# Patient Record
Sex: MALE | Race: WHITE | Employment: OTHER | URBAN - METROPOLITAN AREA
[De-identification: names, ages, dates, MRNs, and addresses within clinical notes are randomized per-mention and may not be internally consistent; named-entity substitution may affect disease eponyms.]

---

## 2017-05-13 PROBLEM — R00.0 SINUS TACHYCARDIA: Status: ACTIVE | Noted: 2017-05-13

## 2017-06-02 PROBLEM — L12.0 BULLOUS PEMPHIGOID: Status: ACTIVE | Noted: 2017-06-02

## 2017-10-21 PROBLEM — N17.9 AKI (ACUTE KIDNEY INJURY) (HCC): Status: ACTIVE | Noted: 2017-10-21

## 2017-10-21 PROBLEM — N39.0 UTI (URINARY TRACT INFECTION): Status: ACTIVE | Noted: 2017-10-21

## 2017-10-21 PROBLEM — E16.2 HYPOGLYCEMIA: Status: ACTIVE | Noted: 2017-10-21

## 2018-04-02 ENCOUNTER — HOSPITAL ENCOUNTER (INPATIENT)
Age: 68
LOS: 3 days | Discharge: NURSING, REHAB OR LONG-TERM CARE | DRG: 638 | End: 2018-04-05
Attending: EMERGENCY MEDICINE | Admitting: INTERNAL MEDICINE
Payer: COMMERCIAL

## 2018-04-02 DIAGNOSIS — D64.9 ANEMIA, UNSPECIFIED TYPE: Primary | ICD-10-CM

## 2018-04-02 DIAGNOSIS — N17.9 ACUTE KIDNEY INJURY (HCC): ICD-10-CM

## 2018-04-02 DIAGNOSIS — E86.0 DEHYDRATION: ICD-10-CM

## 2018-04-02 DIAGNOSIS — R73.9 HYPERGLYCEMIA: ICD-10-CM

## 2018-04-02 PROBLEM — N39.0 UTI (URINARY TRACT INFECTION): Status: RESOLVED | Noted: 2017-10-21 | Resolved: 2018-04-02

## 2018-04-02 PROBLEM — D63.8 ANEMIA OF CHRONIC DISEASE: Chronic | Status: ACTIVE | Noted: 2018-04-02

## 2018-04-02 PROBLEM — E16.2 HYPOGLYCEMIA: Status: RESOLVED | Noted: 2017-10-21 | Resolved: 2018-04-02

## 2018-04-02 PROBLEM — E78.5 HYPERLIPIDEMIA LDL GOAL <100: Chronic | Status: ACTIVE | Noted: 2018-04-02

## 2018-04-02 PROBLEM — L12.0 BULLOUS PEMPHIGOID: Status: RESOLVED | Noted: 2017-06-02 | Resolved: 2018-04-02

## 2018-04-02 PROBLEM — E03.9 ACQUIRED HYPOTHYROIDISM: Chronic | Status: ACTIVE | Noted: 2018-04-02

## 2018-04-02 PROBLEM — R00.0 SINUS TACHYCARDIA: Status: RESOLVED | Noted: 2017-05-13 | Resolved: 2018-04-02

## 2018-04-02 LAB
ABO/RH: NORMAL
ABO/RH: NORMAL
ALBUMIN SERPL-MCNC: 2.9 G/DL (ref 3.5–5.2)
ALP BLD-CCNC: 87 U/L (ref 40–129)
ALT SERPL-CCNC: 18 U/L (ref 0–40)
ANION GAP SERPL CALCULATED.3IONS-SCNC: 13 MMOL/L (ref 7–16)
ANION GAP SERPL CALCULATED.3IONS-SCNC: 18 MMOL/L (ref 7–16)
ANISOCYTOSIS: ABNORMAL
ANTIBODY IDENTIFICATION: NORMAL
ANTIBODY SCREEN: NORMAL
AST SERPL-CCNC: 11 U/L (ref 0–39)
BACTERIA: NORMAL /HPF
BASOPHILS ABSOLUTE: 0.06 E9/L (ref 0–0.2)
BASOPHILS RELATIVE PERCENT: 0.6 % (ref 0–2)
BETA-HYDROXYBUTYRATE: 0.04 MMOL/L (ref 0.02–0.27)
BILIRUB SERPL-MCNC: <0.2 MG/DL (ref 0–1.2)
BILIRUBIN URINE: NEGATIVE
BLOOD, URINE: NEGATIVE
BUN BLDV-MCNC: 42 MG/DL (ref 8–23)
BUN BLDV-MCNC: 48 MG/DL (ref 8–23)
CALCIUM SERPL-MCNC: 9.6 MG/DL (ref 8.6–10.2)
CALCIUM SERPL-MCNC: 9.7 MG/DL (ref 8.6–10.2)
CHLORIDE BLD-SCNC: 103 MMOL/L (ref 98–107)
CHLORIDE BLD-SCNC: 105 MMOL/L (ref 98–107)
CHLORIDE URINE RANDOM: 64 MMOL/L
CHP ED QC CHECK: NORMAL
CLARITY: CLEAR
CO2: 17 MMOL/L (ref 22–29)
CO2: 22 MMOL/L (ref 22–29)
COLOR: YELLOW
CREAT SERPL-MCNC: 2.2 MG/DL (ref 0.7–1.2)
CREAT SERPL-MCNC: 2.6 MG/DL (ref 0.7–1.2)
CREATININE URINE: 34 MG/DL (ref 40–278)
DAT POLYSPECIFIC: NORMAL
DR. NOTIFY: NORMAL
EOSINOPHILS ABSOLUTE: 0.47 E9/L (ref 0.05–0.5)
EOSINOPHILS RELATIVE PERCENT: 5 % (ref 0–6)
GFR AFRICAN AMERICAN: 30
GFR AFRICAN AMERICAN: 30
GFR AFRICAN AMERICAN: 36
GFR NON-AFRICAN AMERICAN: 25 ML/MIN/1.73
GFR NON-AFRICAN AMERICAN: 25 ML/MIN/1.73
GFR NON-AFRICAN AMERICAN: 30 ML/MIN/1.73
GLUCOSE BLD-MCNC: 213 MG/DL
GLUCOSE BLD-MCNC: 220 MG/DL
GLUCOSE BLD-MCNC: 228 MG/DL
GLUCOSE BLD-MCNC: 230 MG/DL
GLUCOSE BLD-MCNC: 259 MG/DL (ref 74–109)
GLUCOSE BLD-MCNC: 275 MG/DL
GLUCOSE BLD-MCNC: 315 MG/DL (ref 74–109)
GLUCOSE BLD-MCNC: 331 MG/DL (ref 74–109)
GLUCOSE BLD-MCNC: 412 MG/DL
GLUCOSE URINE: >=1000 MG/DL
HCT VFR BLD CALC: 25.5 % (ref 37–54)
HEMOGLOBIN: 7.2 G/DL (ref 12.5–16.5)
HYPOCHROMIA: ABNORMAL
IMMATURE GRANULOCYTES #: 0.45 E9/L
IMMATURE GRANULOCYTES %: 4.8 % (ref 0–5)
KETONES, URINE: NEGATIVE MG/DL
LACTIC ACID: 1.6 MMOL/L (ref 0.5–2.2)
LEUKOCYTE ESTERASE, URINE: NEGATIVE
LYMPHOCYTES ABSOLUTE: 1.52 E9/L (ref 1.5–4)
LYMPHOCYTES RELATIVE PERCENT: 16.3 % (ref 20–42)
MCH RBC QN AUTO: 26.2 PG (ref 26–35)
MCHC RBC AUTO-ENTMCNC: 28.2 % (ref 32–34.5)
MCV RBC AUTO: 92.7 FL (ref 80–99.9)
METER GLUCOSE: 213 MG/DL (ref 70–110)
METER GLUCOSE: 220 MG/DL (ref 70–110)
METER GLUCOSE: 228 MG/DL (ref 70–110)
METER GLUCOSE: 230 MG/DL (ref 70–110)
METER GLUCOSE: 241 MG/DL (ref 70–110)
METER GLUCOSE: 250 MG/DL (ref 70–110)
METER GLUCOSE: 275 MG/DL (ref 70–110)
METER GLUCOSE: 412 MG/DL (ref 70–110)
MICROALBUMIN UR-MCNC: 249.7 MG/L
MICROALBUMIN/CREAT UR-RTO: 734.4 (ref 0–30)
MONOCYTES ABSOLUTE: 0.63 E9/L (ref 0.1–0.95)
MONOCYTES RELATIVE PERCENT: 6.8 % (ref 2–12)
NEUTROPHILS ABSOLUTE: 6.2 E9/L (ref 1.8–7.3)
NEUTROPHILS RELATIVE PERCENT: 66.5 % (ref 43–80)
NITRITE, URINE: NEGATIVE
PDW BLD-RTO: 17.5 FL (ref 11.5–15)
PH UA: 6 (ref 5–9)
PH VENOUS: 7.34 (ref 7.3–7.42)
PLATELET # BLD: 389 E9/L (ref 130–450)
PMV BLD AUTO: 10.2 FL (ref 7–12)
POC CHLORIDE: 112 MMOL/L (ref 100–108)
POC CREATININE: 2.6 MG/DL (ref 0.7–1.2)
POC POTASSIUM: 4.3 MMOL/L (ref 3.5–5)
POC SODIUM: 142 MMOL/L (ref 132–146)
POLYCHROMASIA: ABNORMAL
POTASSIUM SERPL-SCNC: 4.4 MMOL/L (ref 3.5–5)
POTASSIUM SERPL-SCNC: 4.9 MMOL/L (ref 3.5–5)
POTASSIUM, UR: 13.4 MMOL/L
PROTEIN PROTEIN: 53 MG/DL (ref 0–12)
PROTEIN UA: ABNORMAL MG/DL
RBC # BLD: 2.75 E12/L (ref 3.8–5.8)
RBC UA: NORMAL /HPF (ref 0–2)
SODIUM BLD-SCNC: 138 MMOL/L (ref 132–146)
SODIUM BLD-SCNC: 140 MMOL/L (ref 132–146)
SODIUM URINE: 83 MMOL/L
SPECIFIC GRAVITY UA: 1.01 (ref 1–1.03)
TOTAL PROTEIN: 6.9 G/DL (ref 6.4–8.3)
UREA NITROGEN, UR: 354 MG/DL (ref 800–1666)
UROBILINOGEN, URINE: 0.2 E.U./DL
WBC # BLD: 9.3 E9/L (ref 4.5–11.5)
WBC UA: NORMAL /HPF (ref 0–5)

## 2018-04-02 PROCEDURE — 51702 INSERT TEMP BLADDER CATH: CPT

## 2018-04-02 PROCEDURE — 2580000003 HC RX 258: Performed by: EMERGENCY MEDICINE

## 2018-04-02 PROCEDURE — 86880 COOMBS TEST DIRECT: CPT

## 2018-04-02 PROCEDURE — 2140000000 HC CCU INTERMEDIATE R&B

## 2018-04-02 PROCEDURE — 82570 ASSAY OF URINE CREATININE: CPT

## 2018-04-02 PROCEDURE — 86922 COMPATIBILITY TEST ANTIGLOB: CPT

## 2018-04-02 PROCEDURE — 86900 BLOOD TYPING SEROLOGIC ABO: CPT

## 2018-04-02 PROCEDURE — 85025 COMPLETE CBC W/AUTO DIFF WBC: CPT

## 2018-04-02 PROCEDURE — 82962 GLUCOSE BLOOD TEST: CPT

## 2018-04-02 PROCEDURE — 83605 ASSAY OF LACTIC ACID: CPT

## 2018-04-02 PROCEDURE — 6360000002 HC RX W HCPCS: Performed by: EMERGENCY MEDICINE

## 2018-04-02 PROCEDURE — 84540 ASSAY OF URINE/UREA-N: CPT

## 2018-04-02 PROCEDURE — 6370000000 HC RX 637 (ALT 250 FOR IP): Performed by: INTERNAL MEDICINE

## 2018-04-02 PROCEDURE — 81001 URINALYSIS AUTO W/SCOPE: CPT

## 2018-04-02 PROCEDURE — 84295 ASSAY OF SERUM SODIUM: CPT

## 2018-04-02 PROCEDURE — 86901 BLOOD TYPING SEROLOGIC RH(D): CPT

## 2018-04-02 PROCEDURE — 82435 ASSAY OF BLOOD CHLORIDE: CPT

## 2018-04-02 PROCEDURE — 36415 COLL VENOUS BLD VENIPUNCTURE: CPT

## 2018-04-02 PROCEDURE — 86870 RBC ANTIBODY IDENTIFICATION: CPT

## 2018-04-02 PROCEDURE — 82565 ASSAY OF CREATININE: CPT

## 2018-04-02 PROCEDURE — 2580000003 HC RX 258: Performed by: INTERNAL MEDICINE

## 2018-04-02 PROCEDURE — 94799 UNLISTED PULMONARY SVC/PX: CPT

## 2018-04-02 PROCEDURE — 84300 ASSAY OF URINE SODIUM: CPT

## 2018-04-02 PROCEDURE — 80053 COMPREHEN METABOLIC PANEL: CPT

## 2018-04-02 PROCEDURE — 82436 ASSAY OF URINE CHLORIDE: CPT

## 2018-04-02 PROCEDURE — 82947 ASSAY GLUCOSE BLOOD QUANT: CPT

## 2018-04-02 PROCEDURE — 82010 KETONE BODYS QUAN: CPT

## 2018-04-02 PROCEDURE — 84132 ASSAY OF SERUM POTASSIUM: CPT

## 2018-04-02 PROCEDURE — 80048 BASIC METABOLIC PNL TOTAL CA: CPT

## 2018-04-02 PROCEDURE — 99285 EMERGENCY DEPT VISIT HI MDM: CPT

## 2018-04-02 PROCEDURE — 84156 ASSAY OF PROTEIN URINE: CPT

## 2018-04-02 PROCEDURE — 82800 BLOOD PH: CPT

## 2018-04-02 PROCEDURE — 86850 RBC ANTIBODY SCREEN: CPT

## 2018-04-02 PROCEDURE — 82044 UR ALBUMIN SEMIQUANTITATIVE: CPT

## 2018-04-02 PROCEDURE — 6360000002 HC RX W HCPCS: Performed by: INTERNAL MEDICINE

## 2018-04-02 PROCEDURE — 94760 N-INVAS EAR/PLS OXIMETRY 1: CPT

## 2018-04-02 PROCEDURE — 84133 ASSAY OF URINE POTASSIUM: CPT

## 2018-04-02 RX ORDER — ONDANSETRON 2 MG/ML
4 INJECTION INTRAMUSCULAR; INTRAVENOUS EVERY 6 HOURS PRN
Status: DISCONTINUED | OUTPATIENT
Start: 2018-04-02 | End: 2018-04-05 | Stop reason: HOSPADM

## 2018-04-02 RX ORDER — WARFARIN SODIUM 3 MG/1
1.5 TABLET ORAL DAILY
COMMUNITY
End: 2019-06-08

## 2018-04-02 RX ORDER — DOXEPIN HYDROCHLORIDE 10 MG/1
10 CAPSULE ORAL NIGHTLY
Status: DISCONTINUED | OUTPATIENT
Start: 2018-04-02 | End: 2018-04-05 | Stop reason: HOSPADM

## 2018-04-02 RX ORDER — CILOSTAZOL 100 MG/1
100 TABLET ORAL 2 TIMES DAILY
Status: DISCONTINUED | OUTPATIENT
Start: 2018-04-02 | End: 2018-04-05 | Stop reason: HOSPADM

## 2018-04-02 RX ORDER — FAMOTIDINE 20 MG/1
20 TABLET, FILM COATED ORAL DAILY
Status: DISCONTINUED | OUTPATIENT
Start: 2018-04-02 | End: 2018-04-03

## 2018-04-02 RX ORDER — 0.9 % SODIUM CHLORIDE 0.9 %
1000 INTRAVENOUS SOLUTION INTRAVENOUS ONCE
Status: COMPLETED | OUTPATIENT
Start: 2018-04-02 | End: 2018-04-02

## 2018-04-02 RX ORDER — AMLODIPINE BESYLATE 2.5 MG/1
2.5 TABLET ORAL DAILY
Status: DISCONTINUED | OUTPATIENT
Start: 2018-04-02 | End: 2018-04-03

## 2018-04-02 RX ORDER — CINACALCET 30 MG/1
30 TABLET, FILM COATED ORAL DAILY
Status: DISCONTINUED | OUTPATIENT
Start: 2018-04-02 | End: 2018-04-05 | Stop reason: HOSPADM

## 2018-04-02 RX ORDER — DAPSONE 25 MG/1
25 TABLET ORAL NIGHTLY
Status: DISCONTINUED | OUTPATIENT
Start: 2018-04-02 | End: 2018-04-05 | Stop reason: HOSPADM

## 2018-04-02 RX ORDER — BUMETANIDE 1 MG/1
1 TABLET ORAL DAILY
Status: DISCONTINUED | OUTPATIENT
Start: 2018-04-02 | End: 2018-04-03

## 2018-04-02 RX ORDER — DOXEPIN HYDROCHLORIDE 10 MG/1
10 CAPSULE ORAL NIGHTLY
COMMUNITY
End: 2018-11-26 | Stop reason: ALTCHOICE

## 2018-04-02 RX ORDER — BUSPIRONE HYDROCHLORIDE 10 MG/1
10 TABLET ORAL 3 TIMES DAILY
Status: DISCONTINUED | OUTPATIENT
Start: 2018-04-02 | End: 2018-04-05 | Stop reason: HOSPADM

## 2018-04-02 RX ORDER — ACETAMINOPHEN 325 MG/1
650 TABLET ORAL EVERY 4 HOURS PRN
Status: DISCONTINUED | OUTPATIENT
Start: 2018-04-02 | End: 2018-04-05 | Stop reason: HOSPADM

## 2018-04-02 RX ORDER — DAPSONE 25 MG/1
25 TABLET ORAL NIGHTLY
COMMUNITY
End: 2018-11-26

## 2018-04-02 RX ORDER — PREDNISONE 1 MG/1
5 TABLET ORAL DAILY
Status: DISCONTINUED | OUTPATIENT
Start: 2018-04-02 | End: 2018-04-05 | Stop reason: HOSPADM

## 2018-04-02 RX ORDER — PRAVASTATIN SODIUM 20 MG
20 TABLET ORAL DAILY
Status: DISCONTINUED | OUTPATIENT
Start: 2018-04-02 | End: 2018-04-05 | Stop reason: HOSPADM

## 2018-04-02 RX ORDER — DEXTROSE MONOHYDRATE 25 G/50ML
12.5 INJECTION, SOLUTION INTRAVENOUS PRN
Status: DISCONTINUED | OUTPATIENT
Start: 2018-04-02 | End: 2018-04-05 | Stop reason: HOSPADM

## 2018-04-02 RX ORDER — PRAMIPEXOLE DIHYDROCHLORIDE 0.12 MG/1
0.12 TABLET ORAL NIGHTLY
Status: DISCONTINUED | OUTPATIENT
Start: 2018-04-02 | End: 2018-04-05 | Stop reason: HOSPADM

## 2018-04-02 RX ORDER — DEXTROSE MONOHYDRATE 50 MG/ML
100 INJECTION, SOLUTION INTRAVENOUS PRN
Status: DISCONTINUED | OUTPATIENT
Start: 2018-04-02 | End: 2018-04-05 | Stop reason: HOSPADM

## 2018-04-02 RX ORDER — NICOTINE POLACRILEX 4 MG
15 LOZENGE BUCCAL PRN
Status: DISCONTINUED | OUTPATIENT
Start: 2018-04-02 | End: 2018-04-05 | Stop reason: HOSPADM

## 2018-04-02 RX ORDER — WARFARIN SODIUM 2.5 MG/1
2.5 TABLET ORAL
Status: COMPLETED | OUTPATIENT
Start: 2018-04-02 | End: 2018-04-02

## 2018-04-02 RX ORDER — SODIUM CHLORIDE 0.9 % (FLUSH) 0.9 %
10 SYRINGE (ML) INJECTION EVERY 12 HOURS SCHEDULED
Status: DISCONTINUED | OUTPATIENT
Start: 2018-04-02 | End: 2018-04-05 | Stop reason: HOSPADM

## 2018-04-02 RX ORDER — LEVOTHYROXINE SODIUM 0.03 MG/1
25 TABLET ORAL EVERY MORNING
Status: DISCONTINUED | OUTPATIENT
Start: 2018-04-03 | End: 2018-04-05 | Stop reason: HOSPADM

## 2018-04-02 RX ORDER — ERGOCALCIFEROL 1.25 MG/1
50000 CAPSULE ORAL WEEKLY
Status: DISCONTINUED | OUTPATIENT
Start: 2018-04-02 | End: 2018-04-05 | Stop reason: HOSPADM

## 2018-04-02 RX ORDER — LANOLIN ALCOHOL/MO/W.PET/CERES
500 CREAM (GRAM) TOPICAL 2 TIMES DAILY
Status: DISCONTINUED | OUTPATIENT
Start: 2018-04-02 | End: 2018-04-05 | Stop reason: HOSPADM

## 2018-04-02 RX ORDER — SODIUM CHLORIDE 0.9 % (FLUSH) 0.9 %
10 SYRINGE (ML) INJECTION PRN
Status: DISCONTINUED | OUTPATIENT
Start: 2018-04-02 | End: 2018-04-05 | Stop reason: HOSPADM

## 2018-04-02 RX ORDER — INSULIN GLARGINE 100 [IU]/ML
30 INJECTION, SOLUTION SUBCUTANEOUS 2 TIMES DAILY
Status: DISCONTINUED | OUTPATIENT
Start: 2018-04-02 | End: 2018-04-05 | Stop reason: HOSPADM

## 2018-04-02 RX ORDER — DAPSONE 25 MG/1
50 TABLET ORAL EVERY MORNING
Status: DISCONTINUED | OUTPATIENT
Start: 2018-04-03 | End: 2018-04-05 | Stop reason: HOSPADM

## 2018-04-02 RX ORDER — BUMETANIDE 1 MG/1
1 TABLET ORAL DAILY
COMMUNITY
End: 2018-11-26 | Stop reason: ALTCHOICE

## 2018-04-02 RX ORDER — SODIUM CHLORIDE 9 MG/ML
INJECTION, SOLUTION INTRAVENOUS CONTINUOUS
Status: DISCONTINUED | OUTPATIENT
Start: 2018-04-02 | End: 2018-04-03

## 2018-04-02 RX ADMIN — DAPSONE 25 MG: 25 TABLET ORAL at 22:28

## 2018-04-02 RX ADMIN — CEFEPIME HYDROCHLORIDE 2 G: 2 INJECTION, POWDER, FOR SOLUTION INTRAVENOUS at 09:40

## 2018-04-02 RX ADMIN — PRAMIPEXOLE DIHYDROCHLORIDE 0.12 MG: 0.12 TABLET ORAL at 22:27

## 2018-04-02 RX ADMIN — BUMETANIDE 1 MG: 1 TABLET ORAL at 18:28

## 2018-04-02 RX ADMIN — ERGOCALCIFEROL 50000 UNITS: 1.25 CAPSULE ORAL at 18:28

## 2018-04-02 RX ADMIN — VANCOMYCIN HYDROCHLORIDE 2000 MG: 1 INJECTION, POWDER, LYOPHILIZED, FOR SOLUTION INTRAVENOUS at 10:14

## 2018-04-02 RX ADMIN — DOXEPIN HYDROCHLORIDE 10 MG: 10 CAPSULE ORAL at 22:27

## 2018-04-02 RX ADMIN — BUSPIRONE HYDROCHLORIDE 10 MG: 10 TABLET ORAL at 22:27

## 2018-04-02 RX ADMIN — Medication 10 ML: at 09:28

## 2018-04-02 RX ADMIN — BUSPIRONE HYDROCHLORIDE 10 MG: 10 TABLET ORAL at 18:28

## 2018-04-02 RX ADMIN — CINACALCET HYDROCHLORIDE 30 MG: 30 TABLET, COATED ORAL at 18:28

## 2018-04-02 RX ADMIN — METOPROLOL TARTRATE 25 MG: 25 TABLET ORAL at 22:29

## 2018-04-02 RX ADMIN — WARFARIN SODIUM 2.5 MG: 2.5 TABLET ORAL at 18:28

## 2018-04-02 RX ADMIN — SODIUM CHLORIDE: 9 INJECTION, SOLUTION INTRAVENOUS at 09:27

## 2018-04-02 RX ADMIN — AMLODIPINE BESYLATE 2.5 MG: 2.5 TABLET ORAL at 18:28

## 2018-04-02 RX ADMIN — Medication 500 MG: at 22:27

## 2018-04-02 RX ADMIN — SODIUM CHLORIDE 1000 ML: 9 INJECTION, SOLUTION INTRAVENOUS at 07:49

## 2018-04-02 RX ADMIN — CILOSTAZOL 100 MG: 100 TABLET ORAL at 22:27

## 2018-04-02 RX ADMIN — INSULIN GLARGINE 30 UNITS: 100 INJECTION, SOLUTION SUBCUTANEOUS at 22:38

## 2018-04-02 RX ADMIN — SODIUM CHLORIDE: 9 INJECTION, SOLUTION INTRAVENOUS at 16:53

## 2018-04-02 RX ADMIN — ENOXAPARIN SODIUM 40 MG: 40 INJECTION SUBCUTANEOUS at 09:27

## 2018-04-02 ASSESSMENT — PAIN SCALES - GENERAL
PAINLEVEL_OUTOF10: 0
PAINLEVEL_OUTOF10: 0

## 2018-04-03 LAB
ALBUMIN SERPL-MCNC: 2.7 G/DL (ref 3.5–5.2)
ALP BLD-CCNC: 79 U/L (ref 40–129)
ALT SERPL-CCNC: 16 U/L (ref 0–40)
ANION GAP SERPL CALCULATED.3IONS-SCNC: 13 MMOL/L (ref 7–16)
AST SERPL-CCNC: 11 U/L (ref 0–39)
B.E.: -4 MMOL/L (ref -3–3)
BASOPHILS ABSOLUTE: 0.08 E9/L (ref 0–0.2)
BASOPHILS RELATIVE PERCENT: 0.9 % (ref 0–2)
BILIRUB SERPL-MCNC: 0.3 MG/DL (ref 0–1.2)
BUN BLDV-MCNC: 36 MG/DL (ref 8–23)
CALCIUM SERPL-MCNC: 9.4 MG/DL (ref 8.6–10.2)
CHLORIDE BLD-SCNC: 109 MMOL/L (ref 98–107)
CO2: 19 MMOL/L (ref 22–29)
COHB: 2.4 % (ref 0–1.5)
CREAT SERPL-MCNC: 2.1 MG/DL (ref 0.7–1.2)
CRITICAL: ABNORMAL
DATE ANALYZED: ABNORMAL
DATE OF COLLECTION: ABNORMAL
EOSINOPHILS ABSOLUTE: 0.48 E9/L (ref 0.05–0.5)
EOSINOPHILS RELATIVE PERCENT: 5.2 % (ref 0–6)
GFR AFRICAN AMERICAN: 38
GFR NON-AFRICAN AMERICAN: 32 ML/MIN/1.73
GLUCOSE BLD-MCNC: 123 MG/DL (ref 74–109)
HCO3: 20.6 MMOL/L (ref 22–26)
HCT VFR BLD CALC: 25.6 % (ref 37–54)
HCT VFR BLD CALC: 26.4 % (ref 37–54)
HEMOGLOBIN: 7.5 G/DL (ref 12.5–16.5)
HEMOGLOBIN: 7.7 G/DL (ref 12.5–16.5)
HHB: 5.2 % (ref 0–5)
IMMATURE GRANULOCYTES #: 0.33 E9/L
IMMATURE GRANULOCYTES %: 3.6 % (ref 0–5)
INR BLD: 1.8
INR BLD: 2
LAB: 9558
LYMPHOCYTES ABSOLUTE: 1.17 E9/L (ref 1.5–4)
LYMPHOCYTES RELATIVE PERCENT: 12.8 % (ref 20–42)
Lab: 1320
MAGNESIUM: 1.9 MG/DL (ref 1.6–2.6)
MCH RBC QN AUTO: 26.7 PG (ref 26–35)
MCHC RBC AUTO-ENTMCNC: 29.3 % (ref 32–34.5)
MCV RBC AUTO: 91.1 FL (ref 80–99.9)
METER GLUCOSE: 125 MG/DL (ref 70–110)
METER GLUCOSE: 214 MG/DL (ref 70–110)
METER GLUCOSE: 230 MG/DL (ref 70–110)
METER GLUCOSE: 238 MG/DL (ref 70–110)
METER GLUCOSE: 249 MG/DL (ref 70–110)
METHB: 0.9 % (ref 0–1.5)
MODE: ABNORMAL
MONOCYTES ABSOLUTE: 0.73 E9/L (ref 0.1–0.95)
MONOCYTES RELATIVE PERCENT: 8 % (ref 2–12)
NEUTROPHILS ABSOLUTE: 6.37 E9/L (ref 1.8–7.3)
NEUTROPHILS RELATIVE PERCENT: 69.5 % (ref 43–80)
O2 CONTENT: 10.3 ML/DL
O2 SATURATION: 94.6 % (ref 92–98.5)
O2HB: 91.5 % (ref 94–97)
OPERATOR ID: ABNORMAL
PATIENT TEMP: 37 C
PCO2: 35.5 MMHG (ref 35–45)
PDW BLD-RTO: 17.2 FL (ref 11.5–15)
PH BLOOD GAS: 7.38 (ref 7.35–7.45)
PLATELET # BLD: 349 E9/L (ref 130–450)
PMV BLD AUTO: 9.8 FL (ref 7–12)
PO2: 75.9 MMHG (ref 60–100)
POTASSIUM SERPL-SCNC: 4.8 MMOL/L (ref 3.5–5)
PROTHROMBIN TIME: 19.7 SEC (ref 9.3–12.4)
PROTHROMBIN TIME: 22.2 SEC (ref 9.3–12.4)
RBC # BLD: 2.81 E12/L (ref 3.8–5.8)
SODIUM BLD-SCNC: 141 MMOL/L (ref 132–146)
SOURCE, BLOOD GAS: ABNORMAL
THB: 7.9 G/DL (ref 11.5–16.5)
TIME ANALYZED: 1329
TOTAL PROTEIN: 6.4 G/DL (ref 6.4–8.3)
WBC # BLD: 9.2 E9/L (ref 4.5–11.5)

## 2018-04-03 PROCEDURE — 2140000000 HC CCU INTERMEDIATE R&B

## 2018-04-03 PROCEDURE — 6370000000 HC RX 637 (ALT 250 FOR IP): Performed by: INTERNAL MEDICINE

## 2018-04-03 PROCEDURE — 85018 HEMOGLOBIN: CPT

## 2018-04-03 PROCEDURE — 82962 GLUCOSE BLOOD TEST: CPT

## 2018-04-03 PROCEDURE — G8981 BODY POS CURRENT STATUS: HCPCS

## 2018-04-03 PROCEDURE — 85014 HEMATOCRIT: CPT

## 2018-04-03 PROCEDURE — 97161 PT EVAL LOW COMPLEX 20 MIN: CPT

## 2018-04-03 PROCEDURE — 97535 SELF CARE MNGMENT TRAINING: CPT

## 2018-04-03 PROCEDURE — 82805 BLOOD GASES W/O2 SATURATION: CPT

## 2018-04-03 PROCEDURE — 85025 COMPLETE CBC W/AUTO DIFF WBC: CPT

## 2018-04-03 PROCEDURE — 6370000000 HC RX 637 (ALT 250 FOR IP): Performed by: SURGERY

## 2018-04-03 PROCEDURE — G8982 BODY POS GOAL STATUS: HCPCS

## 2018-04-03 PROCEDURE — 2580000003 HC RX 258: Performed by: SURGERY

## 2018-04-03 PROCEDURE — 80053 COMPREHEN METABOLIC PANEL: CPT

## 2018-04-03 PROCEDURE — G8988 SELF CARE GOAL STATUS: HCPCS

## 2018-04-03 PROCEDURE — 36415 COLL VENOUS BLD VENIPUNCTURE: CPT

## 2018-04-03 PROCEDURE — 97530 THERAPEUTIC ACTIVITIES: CPT

## 2018-04-03 PROCEDURE — 83735 ASSAY OF MAGNESIUM: CPT

## 2018-04-03 PROCEDURE — 85610 PROTHROMBIN TIME: CPT

## 2018-04-03 PROCEDURE — C9113 INJ PANTOPRAZOLE SODIUM, VIA: HCPCS | Performed by: STUDENT IN AN ORGANIZED HEALTH CARE EDUCATION/TRAINING PROGRAM

## 2018-04-03 PROCEDURE — 97166 OT EVAL MOD COMPLEX 45 MIN: CPT

## 2018-04-03 PROCEDURE — 2580000003 HC RX 258: Performed by: STUDENT IN AN ORGANIZED HEALTH CARE EDUCATION/TRAINING PROGRAM

## 2018-04-03 PROCEDURE — 6360000002 HC RX W HCPCS: Performed by: STUDENT IN AN ORGANIZED HEALTH CARE EDUCATION/TRAINING PROGRAM

## 2018-04-03 PROCEDURE — G8987 SELF CARE CURRENT STATUS: HCPCS

## 2018-04-03 PROCEDURE — 2580000003 HC RX 258: Performed by: INTERNAL MEDICINE

## 2018-04-03 PROCEDURE — 6360000002 HC RX W HCPCS: Performed by: SURGERY

## 2018-04-03 RX ORDER — 0.9 % SODIUM CHLORIDE 0.9 %
10 VIAL (ML) INJECTION 2 TIMES DAILY
Status: DISCONTINUED | OUTPATIENT
Start: 2018-04-03 | End: 2018-04-05 | Stop reason: HOSPADM

## 2018-04-03 RX ORDER — AMLODIPINE BESYLATE 5 MG/1
5 TABLET ORAL DAILY
Status: DISCONTINUED | OUTPATIENT
Start: 2018-04-03 | End: 2018-04-05 | Stop reason: HOSPADM

## 2018-04-03 RX ORDER — PANTOPRAZOLE SODIUM 40 MG/10ML
40 INJECTION, POWDER, LYOPHILIZED, FOR SOLUTION INTRAVENOUS 2 TIMES DAILY
Status: DISCONTINUED | OUTPATIENT
Start: 2018-04-03 | End: 2018-04-05 | Stop reason: HOSPADM

## 2018-04-03 RX ORDER — POLYETHYLENE GLYCOL 3350, SODIUM CHLORIDE, POTASSIUM CHLORIDE, SODIUM BICARBONATE, AND SODIUM SULFATE 240; 5.84; 2.98; 6.72; 22.72 G/4L; G/4L; G/4L; G/4L; G/4L
4000 POWDER, FOR SOLUTION ORAL ONCE
Status: COMPLETED | OUTPATIENT
Start: 2018-04-03 | End: 2018-04-03

## 2018-04-03 RX ORDER — METOPROLOL TARTRATE 50 MG/1
50 TABLET, FILM COATED ORAL 2 TIMES DAILY
Status: DISCONTINUED | OUTPATIENT
Start: 2018-04-03 | End: 2018-04-05 | Stop reason: HOSPADM

## 2018-04-03 RX ADMIN — DOXEPIN HYDROCHLORIDE 10 MG: 10 CAPSULE ORAL at 21:21

## 2018-04-03 RX ADMIN — POLYETHYLENE GLYCOL 3350, SODIUM CHLORIDE, POTASSIUM CHLORIDE, SODIUM BICARBONATE, AND SODIUM SULFATE 4000 ML: 240; 5.84; 2.98; 6.72; 22.72 POWDER, FOR SOLUTION ORAL at 18:37

## 2018-04-03 RX ADMIN — Medication 10 ML: at 09:25

## 2018-04-03 RX ADMIN — PANTOPRAZOLE SODIUM 40 MG: 40 INJECTION, POWDER, FOR SOLUTION INTRAVENOUS at 09:23

## 2018-04-03 RX ADMIN — Medication 10 ML: at 21:23

## 2018-04-03 RX ADMIN — BUSPIRONE HYDROCHLORIDE 10 MG: 10 TABLET ORAL at 13:56

## 2018-04-03 RX ADMIN — AMLODIPINE BESYLATE 2.5 MG: 2.5 TABLET ORAL at 09:24

## 2018-04-03 RX ADMIN — SODIUM CHLORIDE: 9 INJECTION, SOLUTION INTRAVENOUS at 00:50

## 2018-04-03 RX ADMIN — PRAVASTATIN SODIUM 20 MG: 20 TABLET ORAL at 09:24

## 2018-04-03 RX ADMIN — CILOSTAZOL 100 MG: 100 TABLET ORAL at 09:23

## 2018-04-03 RX ADMIN — SODIUM CHLORIDE: 9 INJECTION, SOLUTION INTRAVENOUS at 09:26

## 2018-04-03 RX ADMIN — Medication 500 MG: at 21:22

## 2018-04-03 RX ADMIN — PRAMIPEXOLE DIHYDROCHLORIDE 0.12 MG: 0.12 TABLET ORAL at 21:21

## 2018-04-03 RX ADMIN — Medication 10 ML: at 09:00

## 2018-04-03 RX ADMIN — DAPSONE 50 MG: 25 TABLET ORAL at 09:24

## 2018-04-03 RX ADMIN — Medication 500 MG: at 09:23

## 2018-04-03 RX ADMIN — Medication 10 ML: at 21:26

## 2018-04-03 RX ADMIN — BUSPIRONE HYDROCHLORIDE 10 MG: 10 TABLET ORAL at 09:23

## 2018-04-03 RX ADMIN — METOPROLOL TARTRATE 25 MG: 25 TABLET ORAL at 09:23

## 2018-04-03 RX ADMIN — CINACALCET HYDROCHLORIDE 30 MG: 30 TABLET, COATED ORAL at 09:25

## 2018-04-03 RX ADMIN — BUSPIRONE HYDROCHLORIDE 10 MG: 10 TABLET ORAL at 21:19

## 2018-04-03 RX ADMIN — DAPSONE 25 MG: 25 TABLET ORAL at 21:20

## 2018-04-03 RX ADMIN — PHYTONADIONE 10 MG: 10 INJECTION, EMULSION INTRAMUSCULAR; INTRAVENOUS; SUBCUTANEOUS at 09:23

## 2018-04-03 RX ADMIN — PREDNISONE 5 MG: 5 TABLET ORAL at 09:23

## 2018-04-03 RX ADMIN — INSULIN GLARGINE 30 UNITS: 100 INJECTION, SOLUTION SUBCUTANEOUS at 22:42

## 2018-04-03 RX ADMIN — CILOSTAZOL 100 MG: 100 TABLET ORAL at 22:35

## 2018-04-03 RX ADMIN — INSULIN GLARGINE 30 UNITS: 100 INJECTION, SOLUTION SUBCUTANEOUS at 09:25

## 2018-04-03 RX ADMIN — LEVOTHYROXINE SODIUM 25 MCG: 25 TABLET ORAL at 09:24

## 2018-04-03 RX ADMIN — AMLODIPINE BESYLATE 5 MG: 5 TABLET ORAL at 13:56

## 2018-04-03 RX ADMIN — METOPROLOL TARTRATE 50 MG: 50 TABLET ORAL at 21:33

## 2018-04-03 RX ADMIN — PANTOPRAZOLE SODIUM 40 MG: 40 INJECTION, POWDER, FOR SOLUTION INTRAVENOUS at 21:23

## 2018-04-03 ASSESSMENT — PAIN SCALES - GENERAL
PAINLEVEL_OUTOF10: 0

## 2018-04-04 ENCOUNTER — ANESTHESIA EVENT (OUTPATIENT)
Dept: ENDOSCOPY | Age: 68
DRG: 638 | End: 2018-04-04
Payer: COMMERCIAL

## 2018-04-04 ENCOUNTER — ANESTHESIA (OUTPATIENT)
Dept: ENDOSCOPY | Age: 68
DRG: 638 | End: 2018-04-04
Payer: COMMERCIAL

## 2018-04-04 VITALS
RESPIRATION RATE: 26 BRPM | SYSTOLIC BLOOD PRESSURE: 140 MMHG | OXYGEN SATURATION: 90 % | DIASTOLIC BLOOD PRESSURE: 64 MMHG

## 2018-04-04 LAB
ANION GAP SERPL CALCULATED.3IONS-SCNC: 14 MMOL/L (ref 7–16)
BUN BLDV-MCNC: 25 MG/DL (ref 8–23)
CALCIUM SERPL-MCNC: 10.1 MG/DL (ref 8.6–10.2)
CHLORIDE BLD-SCNC: 106 MMOL/L (ref 98–107)
CO2: 20 MMOL/L (ref 22–29)
CREAT SERPL-MCNC: 1.8 MG/DL (ref 0.7–1.2)
GFR AFRICAN AMERICAN: 46
GFR NON-AFRICAN AMERICAN: 38 ML/MIN/1.73
GLUCOSE BLD-MCNC: 161 MG/DL (ref 74–109)
HCT VFR BLD CALC: 26.4 % (ref 37–54)
HCT VFR BLD CALC: 27.2 % (ref 37–54)
HEMOGLOBIN: 7.6 G/DL (ref 12.5–16.5)
HEMOGLOBIN: 8 G/DL (ref 12.5–16.5)
INR BLD: 1.5
MAGNESIUM: 2 MG/DL (ref 1.6–2.6)
METER GLUCOSE: 155 MG/DL (ref 70–110)
METER GLUCOSE: 169 MG/DL (ref 70–110)
METER GLUCOSE: 180 MG/DL (ref 70–110)
METER GLUCOSE: 287 MG/DL (ref 70–110)
PHOSPHORUS: 2.3 MG/DL (ref 2.5–4.5)
POTASSIUM SERPL-SCNC: 4.4 MMOL/L (ref 3.5–5)
PROTHROMBIN TIME: 16.2 SEC (ref 9.3–12.4)
SODIUM BLD-SCNC: 140 MMOL/L (ref 132–146)

## 2018-04-04 PROCEDURE — 3700000000 HC ANESTHESIA ATTENDED CARE: Performed by: SURGERY

## 2018-04-04 PROCEDURE — C9113 INJ PANTOPRAZOLE SODIUM, VIA: HCPCS | Performed by: STUDENT IN AN ORGANIZED HEALTH CARE EDUCATION/TRAINING PROGRAM

## 2018-04-04 PROCEDURE — 6360000002 HC RX W HCPCS: Performed by: STUDENT IN AN ORGANIZED HEALTH CARE EDUCATION/TRAINING PROGRAM

## 2018-04-04 PROCEDURE — 2140000000 HC CCU INTERMEDIATE R&B

## 2018-04-04 PROCEDURE — 36415 COLL VENOUS BLD VENIPUNCTURE: CPT

## 2018-04-04 PROCEDURE — 7100000001 HC PACU RECOVERY - ADDTL 15 MIN: Performed by: SURGERY

## 2018-04-04 PROCEDURE — 85018 HEMOGLOBIN: CPT

## 2018-04-04 PROCEDURE — 85014 HEMATOCRIT: CPT

## 2018-04-04 PROCEDURE — 3609009500 HC COLONOSCOPY DIAGNOSTIC OR SCREENING: Performed by: SURGERY

## 2018-04-04 PROCEDURE — 80048 BASIC METABOLIC PNL TOTAL CA: CPT

## 2018-04-04 PROCEDURE — 0DB78ZX EXCISION OF STOMACH, PYLORUS, VIA NATURAL OR ARTIFICIAL OPENING ENDOSCOPIC, DIAGNOSTIC: ICD-10-PCS | Performed by: SURGERY

## 2018-04-04 PROCEDURE — 7100000000 HC PACU RECOVERY - FIRST 15 MIN: Performed by: SURGERY

## 2018-04-04 PROCEDURE — 82962 GLUCOSE BLOOD TEST: CPT

## 2018-04-04 PROCEDURE — 2580000003 HC RX 258: Performed by: STUDENT IN AN ORGANIZED HEALTH CARE EDUCATION/TRAINING PROGRAM

## 2018-04-04 PROCEDURE — 2580000003 HC RX 258: Performed by: INTERNAL MEDICINE

## 2018-04-04 PROCEDURE — 6360000002 HC RX W HCPCS: Performed by: NURSE ANESTHETIST, CERTIFIED REGISTERED

## 2018-04-04 PROCEDURE — 88342 IMHCHEM/IMCYTCHM 1ST ANTB: CPT

## 2018-04-04 PROCEDURE — 85610 PROTHROMBIN TIME: CPT

## 2018-04-04 PROCEDURE — 84100 ASSAY OF PHOSPHORUS: CPT

## 2018-04-04 PROCEDURE — 2580000003 HC RX 258: Performed by: NURSE ANESTHETIST, CERTIFIED REGISTERED

## 2018-04-04 PROCEDURE — 6370000000 HC RX 637 (ALT 250 FOR IP): Performed by: INTERNAL MEDICINE

## 2018-04-04 PROCEDURE — 3609012400 HC EGD TRANSORAL BIOPSY SINGLE/MULTIPLE: Performed by: SURGERY

## 2018-04-04 PROCEDURE — 0DJD8ZZ INSPECTION OF LOWER INTESTINAL TRACT, VIA NATURAL OR ARTIFICIAL OPENING ENDOSCOPIC: ICD-10-PCS | Performed by: SURGERY

## 2018-04-04 PROCEDURE — 3700000001 HC ADD 15 MINUTES (ANESTHESIA): Performed by: SURGERY

## 2018-04-04 PROCEDURE — 88305 TISSUE EXAM BY PATHOLOGIST: CPT

## 2018-04-04 PROCEDURE — 83735 ASSAY OF MAGNESIUM: CPT

## 2018-04-04 RX ORDER — PROPOFOL 10 MG/ML
INJECTION, EMULSION INTRAVENOUS PRN
Status: DISCONTINUED | OUTPATIENT
Start: 2018-04-04 | End: 2018-04-04 | Stop reason: SDUPTHER

## 2018-04-04 RX ORDER — DEXTROSE AND SODIUM CHLORIDE 5; .45 G/100ML; G/100ML
INJECTION, SOLUTION INTRAVENOUS CONTINUOUS
Status: DISCONTINUED | OUTPATIENT
Start: 2018-04-04 | End: 2018-04-04

## 2018-04-04 RX ORDER — SODIUM CHLORIDE 9 MG/ML
INJECTION, SOLUTION INTRAVENOUS CONTINUOUS PRN
Status: DISCONTINUED | OUTPATIENT
Start: 2018-04-04 | End: 2018-04-04 | Stop reason: SDUPTHER

## 2018-04-04 RX ADMIN — DEXTROSE AND SODIUM CHLORIDE: 5; 450 INJECTION, SOLUTION INTRAVENOUS at 12:39

## 2018-04-04 RX ADMIN — METOPROLOL TARTRATE 50 MG: 50 TABLET ORAL at 22:00

## 2018-04-04 RX ADMIN — SODIUM CHLORIDE: 9 INJECTION, SOLUTION INTRAVENOUS at 13:47

## 2018-04-04 RX ADMIN — BUSPIRONE HYDROCHLORIDE 10 MG: 10 TABLET ORAL at 09:54

## 2018-04-04 RX ADMIN — Medication 10 ML: at 22:00

## 2018-04-04 RX ADMIN — LEVOTHYROXINE SODIUM 25 MCG: 25 TABLET ORAL at 09:54

## 2018-04-04 RX ADMIN — PROPOFOL 50 MG: 10 INJECTION, EMULSION INTRAVENOUS at 14:10

## 2018-04-04 RX ADMIN — PREDNISONE 5 MG: 5 TABLET ORAL at 09:54

## 2018-04-04 RX ADMIN — INSULIN GLARGINE 30 UNITS: 100 INJECTION, SOLUTION SUBCUTANEOUS at 22:10

## 2018-04-04 RX ADMIN — DAPSONE 25 MG: 25 TABLET ORAL at 22:01

## 2018-04-04 RX ADMIN — PROPOFOL 50 MG: 10 INJECTION, EMULSION INTRAVENOUS at 14:15

## 2018-04-04 RX ADMIN — PANTOPRAZOLE SODIUM 40 MG: 40 INJECTION, POWDER, FOR SOLUTION INTRAVENOUS at 22:00

## 2018-04-04 RX ADMIN — BUSPIRONE HYDROCHLORIDE 10 MG: 10 TABLET ORAL at 16:19

## 2018-04-04 RX ADMIN — Medication 10 ML: at 22:02

## 2018-04-04 RX ADMIN — Medication 10 ML: at 09:56

## 2018-04-04 RX ADMIN — METOPROLOL TARTRATE 50 MG: 50 TABLET ORAL at 09:54

## 2018-04-04 RX ADMIN — PROPOFOL 50 MG: 10 INJECTION, EMULSION INTRAVENOUS at 14:05

## 2018-04-04 RX ADMIN — PROPOFOL 80 MG: 10 INJECTION, EMULSION INTRAVENOUS at 13:55

## 2018-04-04 RX ADMIN — BUSPIRONE HYDROCHLORIDE 10 MG: 10 TABLET ORAL at 22:01

## 2018-04-04 RX ADMIN — Medication 500 MG: at 22:00

## 2018-04-04 RX ADMIN — PRAMIPEXOLE DIHYDROCHLORIDE 0.12 MG: 0.12 TABLET ORAL at 22:01

## 2018-04-04 RX ADMIN — Medication 10 ML: at 08:15

## 2018-04-04 RX ADMIN — PANTOPRAZOLE SODIUM 40 MG: 40 INJECTION, POWDER, FOR SOLUTION INTRAVENOUS at 09:55

## 2018-04-04 RX ADMIN — DAPSONE 50 MG: 25 TABLET ORAL at 10:44

## 2018-04-04 RX ADMIN — AMLODIPINE BESYLATE 5 MG: 5 TABLET ORAL at 09:54

## 2018-04-04 RX ADMIN — PROPOFOL 50 MG: 10 INJECTION, EMULSION INTRAVENOUS at 14:00

## 2018-04-04 RX ADMIN — Medication 500 MG: at 09:55

## 2018-04-04 RX ADMIN — DOXEPIN HYDROCHLORIDE 10 MG: 10 CAPSULE ORAL at 22:00

## 2018-04-04 RX ADMIN — PRAVASTATIN SODIUM 20 MG: 20 TABLET ORAL at 09:54

## 2018-04-04 RX ADMIN — CILOSTAZOL 100 MG: 100 TABLET ORAL at 22:00

## 2018-04-04 ASSESSMENT — PAIN DESCRIPTION - PAIN TYPE: TYPE: ACUTE PAIN;CHRONIC PAIN

## 2018-04-04 ASSESSMENT — PAIN SCALES - GENERAL
PAINLEVEL_OUTOF10: 0

## 2018-04-05 VITALS
BODY MASS INDEX: 40.84 KG/M2 | HEIGHT: 72 IN | HEART RATE: 81 BPM | OXYGEN SATURATION: 91 % | SYSTOLIC BLOOD PRESSURE: 178 MMHG | DIASTOLIC BLOOD PRESSURE: 83 MMHG | RESPIRATION RATE: 18 BRPM | WEIGHT: 301.5 LBS | TEMPERATURE: 99 F

## 2018-04-05 PROBLEM — K29.00 ACUTE GASTRITIS WITHOUT HEMORRHAGE: Status: ACTIVE | Noted: 2018-04-05

## 2018-04-05 PROBLEM — K57.30 DIVERTICULOSIS OF COLON: Status: ACTIVE | Noted: 2018-04-05

## 2018-04-05 LAB
ANION GAP SERPL CALCULATED.3IONS-SCNC: 16 MMOL/L (ref 7–16)
BUN BLDV-MCNC: 22 MG/DL (ref 8–23)
CALCIUM SERPL-MCNC: 9.8 MG/DL (ref 8.6–10.2)
CHLORIDE BLD-SCNC: 106 MMOL/L (ref 98–107)
CO2: 20 MMOL/L (ref 22–29)
CREAT SERPL-MCNC: 1.8 MG/DL (ref 0.7–1.2)
GFR AFRICAN AMERICAN: 46
GFR NON-AFRICAN AMERICAN: 38 ML/MIN/1.73
GLUCOSE BLD-MCNC: 199 MG/DL (ref 74–109)
HCT VFR BLD CALC: 27.5 % (ref 37–54)
HEMOGLOBIN: 7.9 G/DL (ref 12.5–16.5)
INR BLD: 1.2
MAGNESIUM: 1.7 MG/DL (ref 1.6–2.6)
METER GLUCOSE: 208 MG/DL (ref 70–110)
METER GLUCOSE: 410 MG/DL (ref 70–110)
METER GLUCOSE: 419 MG/DL (ref 70–110)
METER GLUCOSE: 477 MG/DL (ref 70–110)
PHOSPHORUS: 2.4 MG/DL (ref 2.5–4.5)
POTASSIUM SERPL-SCNC: 4.6 MMOL/L (ref 3.5–5)
PROTHROMBIN TIME: 13.5 SEC (ref 9.3–12.4)
SODIUM BLD-SCNC: 142 MMOL/L (ref 132–146)

## 2018-04-05 PROCEDURE — 2580000003 HC RX 258: Performed by: STUDENT IN AN ORGANIZED HEALTH CARE EDUCATION/TRAINING PROGRAM

## 2018-04-05 PROCEDURE — 6370000000 HC RX 637 (ALT 250 FOR IP): Performed by: INTERNAL MEDICINE

## 2018-04-05 PROCEDURE — 85018 HEMOGLOBIN: CPT

## 2018-04-05 PROCEDURE — 6360000002 HC RX W HCPCS: Performed by: STUDENT IN AN ORGANIZED HEALTH CARE EDUCATION/TRAINING PROGRAM

## 2018-04-05 PROCEDURE — 80048 BASIC METABOLIC PNL TOTAL CA: CPT

## 2018-04-05 PROCEDURE — 36415 COLL VENOUS BLD VENIPUNCTURE: CPT

## 2018-04-05 PROCEDURE — 82962 GLUCOSE BLOOD TEST: CPT

## 2018-04-05 PROCEDURE — 85014 HEMATOCRIT: CPT

## 2018-04-05 PROCEDURE — C9113 INJ PANTOPRAZOLE SODIUM, VIA: HCPCS | Performed by: STUDENT IN AN ORGANIZED HEALTH CARE EDUCATION/TRAINING PROGRAM

## 2018-04-05 PROCEDURE — 85610 PROTHROMBIN TIME: CPT

## 2018-04-05 PROCEDURE — 83735 ASSAY OF MAGNESIUM: CPT

## 2018-04-05 PROCEDURE — 2580000003 HC RX 258: Performed by: INTERNAL MEDICINE

## 2018-04-05 PROCEDURE — 84100 ASSAY OF PHOSPHORUS: CPT

## 2018-04-05 PROCEDURE — 6360000002 HC RX W HCPCS: Performed by: INTERNAL MEDICINE

## 2018-04-05 RX ORDER — BUMETANIDE 1 MG/1
1 TABLET ORAL DAILY
Status: DISCONTINUED | OUTPATIENT
Start: 2018-04-05 | End: 2018-04-05 | Stop reason: HOSPADM

## 2018-04-05 RX ORDER — METOPROLOL TARTRATE 50 MG/1
50 TABLET, FILM COATED ORAL 2 TIMES DAILY
Qty: 60 TABLET | Refills: 0 | Status: ON HOLD | DISCHARGE
Start: 2018-04-05 | End: 2018-11-28 | Stop reason: HOSPADM

## 2018-04-05 RX ORDER — AMLODIPINE BESYLATE 5 MG/1
5 TABLET ORAL DAILY
Qty: 30 TABLET | Refills: 0 | DISCHARGE
Start: 2018-04-06

## 2018-04-05 RX ADMIN — Medication 10 ML: at 09:00

## 2018-04-05 RX ADMIN — INSULIN GLARGINE 30 UNITS: 100 INJECTION, SOLUTION SUBCUTANEOUS at 09:59

## 2018-04-05 RX ADMIN — BUMETANIDE 1 MG: 1 TABLET ORAL at 13:16

## 2018-04-05 RX ADMIN — BUSPIRONE HYDROCHLORIDE 10 MG: 10 TABLET ORAL at 13:16

## 2018-04-05 RX ADMIN — PRAVASTATIN SODIUM 20 MG: 20 TABLET ORAL at 09:58

## 2018-04-05 RX ADMIN — DARBEPOETIN ALFA 60 MCG: 60 INJECTION, SOLUTION INTRAVENOUS; SUBCUTANEOUS at 18:12

## 2018-04-05 RX ADMIN — INSULIN LISPRO 12 UNITS: 100 INJECTION, SOLUTION INTRAVENOUS; SUBCUTANEOUS at 13:16

## 2018-04-05 RX ADMIN — CILOSTAZOL 100 MG: 100 TABLET ORAL at 09:58

## 2018-04-05 RX ADMIN — PREDNISONE 5 MG: 5 TABLET ORAL at 09:58

## 2018-04-05 RX ADMIN — LEVOTHYROXINE SODIUM 25 MCG: 25 TABLET ORAL at 09:58

## 2018-04-05 RX ADMIN — PANTOPRAZOLE SODIUM 40 MG: 40 INJECTION, POWDER, FOR SOLUTION INTRAVENOUS at 09:57

## 2018-04-05 RX ADMIN — Medication 10 ML: at 09:59

## 2018-04-05 RX ADMIN — METOPROLOL TARTRATE 50 MG: 50 TABLET ORAL at 09:58

## 2018-04-05 RX ADMIN — INSULIN LISPRO 12 UNITS: 100 INJECTION, SOLUTION INTRAVENOUS; SUBCUTANEOUS at 16:56

## 2018-04-05 RX ADMIN — AMLODIPINE BESYLATE 5 MG: 5 TABLET ORAL at 09:58

## 2018-04-05 RX ADMIN — DAPSONE 50 MG: 25 TABLET ORAL at 09:58

## 2018-04-05 RX ADMIN — CINACALCET HYDROCHLORIDE 30 MG: 30 TABLET, COATED ORAL at 09:57

## 2018-04-05 RX ADMIN — BUSPIRONE HYDROCHLORIDE 10 MG: 10 TABLET ORAL at 09:58

## 2018-04-05 RX ADMIN — Medication 500 MG: at 09:57

## 2018-04-05 ASSESSMENT — PAIN SCALES - GENERAL
PAINLEVEL_OUTOF10: 0
PAINLEVEL_OUTOF10: 0

## 2018-04-06 LAB
BLOOD BANK DISPENSE STATUS: NORMAL
BLOOD BANK DISPENSE STATUS: NORMAL
BLOOD BANK PRODUCT CODE: NORMAL
BLOOD BANK PRODUCT CODE: NORMAL
BPU ID: NORMAL
BPU ID: NORMAL
DESCRIPTION BLOOD BANK: NORMAL
DESCRIPTION BLOOD BANK: NORMAL

## 2018-04-25 ENCOUNTER — HOSPITAL ENCOUNTER (OUTPATIENT)
Dept: INFUSION THERAPY | Age: 68
Setting detail: INFUSION SERIES
Discharge: HOME | End: 2018-04-25
Payer: COMMERCIAL

## 2018-04-25 VITALS
HEART RATE: 72 BPM | DIASTOLIC BLOOD PRESSURE: 50 MMHG | SYSTOLIC BLOOD PRESSURE: 104 MMHG | TEMPERATURE: 97.9 F | OXYGEN SATURATION: 95 % | RESPIRATION RATE: 18 BRPM

## 2018-04-25 DIAGNOSIS — N18.30 CKD (CHRONIC KIDNEY DISEASE) STAGE 3, GFR 30-59 ML/MIN (HCC): ICD-10-CM

## 2018-04-25 DIAGNOSIS — D63.8 ANEMIA OF CHRONIC DISEASE: ICD-10-CM

## 2018-04-25 PROCEDURE — 96365 THER/PROPH/DIAG IV INF INIT: CPT

## 2018-04-25 PROCEDURE — 6360000002 HC RX W HCPCS: Performed by: INTERNAL MEDICINE

## 2018-04-25 PROCEDURE — 2580000003 HC RX 258: Performed by: INTERNAL MEDICINE

## 2018-04-25 RX ORDER — SODIUM CHLORIDE 0.9 % (FLUSH) 0.9 %
10 SYRINGE (ML) INJECTION PRN
Status: CANCELLED | OUTPATIENT
Start: 2018-04-25

## 2018-04-25 RX ORDER — SODIUM CHLORIDE 0.9 % (FLUSH) 0.9 %
10 SYRINGE (ML) INJECTION PRN
Status: DISCONTINUED | OUTPATIENT
Start: 2018-04-25 | End: 2018-04-26 | Stop reason: HOSPADM

## 2018-04-25 RX ORDER — DIPHENHYDRAMINE HYDROCHLORIDE 50 MG/ML
50 INJECTION INTRAMUSCULAR; INTRAVENOUS ONCE
Status: DISCONTINUED | OUTPATIENT
Start: 2018-04-25 | End: 2018-04-26 | Stop reason: HOSPADM

## 2018-04-25 RX ORDER — DIPHENHYDRAMINE HYDROCHLORIDE 50 MG/ML
50 INJECTION INTRAMUSCULAR; INTRAVENOUS ONCE
Status: CANCELLED | OUTPATIENT
Start: 2018-04-25 | End: 2018-04-25

## 2018-04-25 RX ADMIN — Medication 10 ML: at 13:13

## 2018-04-25 RX ADMIN — FERUMOXYTOL 510 MG: 510 INJECTION INTRAVENOUS at 13:17

## 2018-04-25 NOTE — PROGRESS NOTES
John downs called at13:35, 14:20, 15:00, 15:50, as they have not arrived to come and pick patient up from infusion services to go back to [facility]. Director of nursing called and spoke to her twice regarding matter, she has called Rachel Rival Wes twice as well. Still awaiting ride at 16:00 .

## 2018-04-25 NOTE — PROGRESS NOTES
[facility] director of nursing called back at 16:05 and she states that Bertrand Reese from activities is coming with Lalo Martínez from facility to pick patient up. If Sissy  Carriers arrives in the mean time was told to let them leave and that nursing home has provided their own transportation.  Notified our  Melba Krishna as well relating to this situation

## 2018-05-02 ENCOUNTER — HOSPITAL ENCOUNTER (OUTPATIENT)
Dept: INFUSION THERAPY | Age: 68
Setting detail: INFUSION SERIES
Discharge: HOME | End: 2018-05-02
Payer: COMMERCIAL

## 2018-05-02 DIAGNOSIS — N18.30 CKD (CHRONIC KIDNEY DISEASE) STAGE 3, GFR 30-59 ML/MIN (HCC): ICD-10-CM

## 2018-05-02 DIAGNOSIS — D63.8 ANEMIA OF CHRONIC DISEASE: ICD-10-CM

## 2018-05-02 PROCEDURE — 96365 THER/PROPH/DIAG IV INF INIT: CPT

## 2018-05-02 PROCEDURE — 2580000003 HC RX 258: Performed by: INTERNAL MEDICINE

## 2018-05-02 PROCEDURE — 6360000002 HC RX W HCPCS: Performed by: INTERNAL MEDICINE

## 2018-05-02 PROCEDURE — 2580000003 HC RX 258

## 2018-05-02 RX ORDER — SODIUM CHLORIDE 0.9 % (FLUSH) 0.9 %
10 SYRINGE (ML) INJECTION PRN
Status: DISCONTINUED | OUTPATIENT
Start: 2018-05-02 | End: 2018-05-03 | Stop reason: HOSPADM

## 2018-05-02 RX ORDER — DIPHENHYDRAMINE HYDROCHLORIDE 50 MG/ML
50 INJECTION INTRAMUSCULAR; INTRAVENOUS ONCE
Status: CANCELLED | OUTPATIENT
Start: 2018-05-02 | End: 2018-05-02

## 2018-05-02 RX ORDER — SODIUM CHLORIDE 0.9 % (FLUSH) 0.9 %
SYRINGE (ML) INJECTION
Status: COMPLETED
Start: 2018-05-02 | End: 2018-05-02

## 2018-05-02 RX ORDER — SODIUM CHLORIDE 0.9 % (FLUSH) 0.9 %
10 SYRINGE (ML) INJECTION PRN
Status: CANCELLED | OUTPATIENT
Start: 2018-05-02

## 2018-05-02 RX ADMIN — FERUMOXYTOL 510 MG: 510 INJECTION INTRAVENOUS at 12:08

## 2018-05-02 RX ADMIN — Medication 10 ML: at 12:08

## 2018-05-31 RX ORDER — DIPHENHYDRAMINE HYDROCHLORIDE 50 MG/ML
50 INJECTION INTRAMUSCULAR; INTRAVENOUS ONCE
Status: CANCELLED | OUTPATIENT
Start: 2018-06-06 | End: 2018-06-06

## 2018-05-31 RX ORDER — SODIUM CHLORIDE 9 MG/ML
INJECTION, SOLUTION INTRAVENOUS CONTINUOUS
Status: CANCELLED | OUTPATIENT
Start: 2018-06-06

## 2018-06-06 ENCOUNTER — HOSPITAL ENCOUNTER (OUTPATIENT)
Dept: INFUSION THERAPY | Age: 68
Setting detail: INFUSION SERIES
Discharge: HOME | End: 2018-06-06
Payer: COMMERCIAL

## 2018-06-06 VITALS
TEMPERATURE: 97.4 F | DIASTOLIC BLOOD PRESSURE: 56 MMHG | RESPIRATION RATE: 20 BRPM | HEART RATE: 60 BPM | SYSTOLIC BLOOD PRESSURE: 134 MMHG

## 2018-06-06 DIAGNOSIS — N18.30 CKD (CHRONIC KIDNEY DISEASE) STAGE 3, GFR 30-59 ML/MIN (HCC): ICD-10-CM

## 2018-06-06 DIAGNOSIS — D63.8 ANEMIA OF CHRONIC DISEASE: ICD-10-CM

## 2018-06-06 PROCEDURE — 96365 THER/PROPH/DIAG IV INF INIT: CPT

## 2018-06-06 PROCEDURE — 6360000002 HC RX W HCPCS: Performed by: INTERNAL MEDICINE

## 2018-06-06 PROCEDURE — 2580000003 HC RX 258: Performed by: INTERNAL MEDICINE

## 2018-06-06 RX ORDER — DIPHENHYDRAMINE HYDROCHLORIDE 50 MG/ML
50 INJECTION INTRAMUSCULAR; INTRAVENOUS ONCE
Status: CANCELLED | OUTPATIENT
Start: 2018-06-06 | End: 2018-06-06

## 2018-06-06 RX ORDER — DIVALPROEX SODIUM 125 MG/1
125 TABLET, DELAYED RELEASE ORAL 3 TIMES DAILY
COMMUNITY

## 2018-06-06 RX ORDER — SODIUM CHLORIDE 9 MG/ML
INJECTION, SOLUTION INTRAVENOUS ONCE
Status: CANCELLED | OUTPATIENT
Start: 2018-06-06 | End: 2018-06-06

## 2018-06-06 RX ORDER — ROPINIROLE 0.25 MG/1
0.25 TABLET, FILM COATED ORAL NIGHTLY
COMMUNITY

## 2018-06-06 RX ORDER — SODIUM CHLORIDE 0.9 % (FLUSH) 0.9 %
10 SYRINGE (ML) INJECTION PRN
Status: DISCONTINUED | OUTPATIENT
Start: 2018-06-06 | End: 2018-06-07 | Stop reason: HOSPADM

## 2018-06-06 RX ORDER — SODIUM CHLORIDE 9 MG/ML
INJECTION, SOLUTION INTRAVENOUS CONTINUOUS
Status: CANCELLED | OUTPATIENT
Start: 2018-06-06

## 2018-06-06 RX ORDER — SERTRALINE HYDROCHLORIDE 100 MG/1
100 TABLET, FILM COATED ORAL DAILY
COMMUNITY

## 2018-06-06 RX ORDER — SODIUM CHLORIDE 9 MG/ML
INJECTION, SOLUTION INTRAVENOUS ONCE
Status: DISCONTINUED | OUTPATIENT
Start: 2018-06-06 | End: 2018-06-07 | Stop reason: HOSPADM

## 2018-06-06 RX ORDER — SODIUM CHLORIDE 0.9 % (FLUSH) 0.9 %
10 SYRINGE (ML) INJECTION PRN
Status: CANCELLED | OUTPATIENT
Start: 2018-06-06

## 2018-06-06 RX ADMIN — FERRIC CARBOXYMALTOSE INJECTION 750 MG: 50 INJECTION, SOLUTION INTRAVENOUS at 10:26

## 2018-06-06 RX ADMIN — Medication 10 ML: at 10:02

## 2018-06-13 ENCOUNTER — HOSPITAL ENCOUNTER (OUTPATIENT)
Dept: INFUSION THERAPY | Age: 68
Setting detail: INFUSION SERIES
Discharge: HOME | End: 2018-06-13
Payer: COMMERCIAL

## 2018-06-13 VITALS
OXYGEN SATURATION: 96 % | TEMPERATURE: 97.7 F | DIASTOLIC BLOOD PRESSURE: 52 MMHG | SYSTOLIC BLOOD PRESSURE: 119 MMHG | RESPIRATION RATE: 20 BRPM | HEART RATE: 64 BPM

## 2018-06-13 DIAGNOSIS — N18.30 CKD (CHRONIC KIDNEY DISEASE) STAGE 3, GFR 30-59 ML/MIN (HCC): ICD-10-CM

## 2018-06-13 DIAGNOSIS — D63.8 ANEMIA OF CHRONIC DISEASE: ICD-10-CM

## 2018-06-13 PROCEDURE — 2580000003 HC RX 258: Performed by: INTERNAL MEDICINE

## 2018-06-13 PROCEDURE — 96365 THER/PROPH/DIAG IV INF INIT: CPT

## 2018-06-13 PROCEDURE — 6360000002 HC RX W HCPCS: Performed by: INTERNAL MEDICINE

## 2018-06-13 RX ORDER — DIPHENHYDRAMINE HYDROCHLORIDE 50 MG/ML
50 INJECTION INTRAMUSCULAR; INTRAVENOUS ONCE
Status: CANCELLED | OUTPATIENT
Start: 2018-06-13 | End: 2018-06-13

## 2018-06-13 RX ORDER — SODIUM CHLORIDE 0.9 % (FLUSH) 0.9 %
10 SYRINGE (ML) INJECTION PRN
Status: DISCONTINUED | OUTPATIENT
Start: 2018-06-13 | End: 2018-06-14 | Stop reason: HOSPADM

## 2018-06-13 RX ORDER — SODIUM CHLORIDE 9 MG/ML
INJECTION, SOLUTION INTRAVENOUS ONCE
Status: DISCONTINUED | OUTPATIENT
Start: 2018-06-13 | End: 2018-06-14 | Stop reason: HOSPADM

## 2018-06-13 RX ORDER — SODIUM CHLORIDE 9 MG/ML
INJECTION, SOLUTION INTRAVENOUS CONTINUOUS
Status: CANCELLED | OUTPATIENT
Start: 2018-06-13

## 2018-06-13 RX ORDER — SODIUM CHLORIDE 0.9 % (FLUSH) 0.9 %
10 SYRINGE (ML) INJECTION PRN
Status: CANCELLED | OUTPATIENT
Start: 2018-06-13

## 2018-06-13 RX ORDER — SODIUM CHLORIDE 9 MG/ML
INJECTION, SOLUTION INTRAVENOUS ONCE
Status: CANCELLED | OUTPATIENT
Start: 2018-06-13 | End: 2018-06-13

## 2018-06-13 RX ADMIN — Medication 10 ML: at 13:44

## 2018-06-13 RX ADMIN — FERRIC CARBOXYMALTOSE INJECTION 750 MG: 50 INJECTION, SOLUTION INTRAVENOUS at 13:12

## 2018-06-13 RX ADMIN — Medication 10 ML: at 13:00

## 2018-11-26 ENCOUNTER — APPOINTMENT (OUTPATIENT)
Dept: CT IMAGING | Age: 68
DRG: 309 | End: 2018-11-26
Payer: COMMERCIAL

## 2018-11-26 ENCOUNTER — APPOINTMENT (OUTPATIENT)
Dept: GENERAL RADIOLOGY | Age: 68
DRG: 309 | End: 2018-11-26
Payer: COMMERCIAL

## 2018-11-26 ENCOUNTER — HOSPITAL ENCOUNTER (INPATIENT)
Age: 68
LOS: 2 days | Discharge: NURSING, REHAB OR LONG-TERM CARE | DRG: 309 | End: 2018-11-28
Attending: EMERGENCY MEDICINE | Admitting: INTERNAL MEDICINE
Payer: COMMERCIAL

## 2018-11-26 DIAGNOSIS — R53.1 GENERALIZED WEAKNESS: ICD-10-CM

## 2018-11-26 DIAGNOSIS — R77.8 ELEVATED TROPONIN: ICD-10-CM

## 2018-11-26 DIAGNOSIS — R00.1 BRADYCARDIA: Primary | ICD-10-CM

## 2018-11-26 DIAGNOSIS — N18.9 CHRONIC RENAL FAILURE, UNSPECIFIED CKD STAGE: ICD-10-CM

## 2018-11-26 PROBLEM — E66.01 MORBID OBESITY (HCC): Chronic | Status: ACTIVE | Noted: 2018-11-26

## 2018-11-26 PROBLEM — K29.00 ACUTE GASTRITIS WITHOUT HEMORRHAGE: Status: RESOLVED | Noted: 2018-04-05 | Resolved: 2018-11-26

## 2018-11-26 LAB
ALBUMIN SERPL-MCNC: 3.4 G/DL (ref 3.5–5.2)
ALP BLD-CCNC: 87 U/L (ref 40–129)
ALT SERPL-CCNC: <5 U/L (ref 0–40)
ANION GAP SERPL CALCULATED.3IONS-SCNC: 9 MMOL/L (ref 7–16)
AST SERPL-CCNC: 9 U/L (ref 0–39)
BACTERIA: ABNORMAL /HPF
BASOPHILS ABSOLUTE: 0.13 E9/L (ref 0–0.2)
BASOPHILS RELATIVE PERCENT: 1.3 % (ref 0–2)
BILIRUB SERPL-MCNC: 0.5 MG/DL (ref 0–1.2)
BILIRUBIN URINE: NEGATIVE
BLOOD, URINE: ABNORMAL
BUN BLDV-MCNC: 34 MG/DL (ref 8–23)
CALCIUM SERPL-MCNC: 9.7 MG/DL (ref 8.6–10.2)
CHLORIDE BLD-SCNC: 108 MMOL/L (ref 98–107)
CLARITY: CLEAR
CO2: 22 MMOL/L (ref 22–29)
COLOR: YELLOW
CREAT SERPL-MCNC: 1.9 MG/DL (ref 0.7–1.2)
EOSINOPHILS ABSOLUTE: 0.4 E9/L (ref 0.05–0.5)
EOSINOPHILS RELATIVE PERCENT: 3.9 % (ref 0–6)
GFR AFRICAN AMERICAN: 43
GFR NON-AFRICAN AMERICAN: 35 ML/MIN/1.73
GLUCOSE BLD-MCNC: 90 MG/DL (ref 74–99)
GLUCOSE URINE: 100 MG/DL
HCT VFR BLD CALC: 33.9 % (ref 37–54)
HEMOGLOBIN: 10.2 G/DL (ref 12.5–16.5)
IMMATURE GRANULOCYTES #: 0.06 E9/L
IMMATURE GRANULOCYTES %: 0.6 % (ref 0–5)
KETONES, URINE: NEGATIVE MG/DL
LACTIC ACID: 1 MMOL/L (ref 0.5–2.2)
LEUKOCYTE ESTERASE, URINE: ABNORMAL
LIPASE: 18 U/L (ref 13–60)
LYMPHOCYTES ABSOLUTE: 1.39 E9/L (ref 1.5–4)
LYMPHOCYTES RELATIVE PERCENT: 13.7 % (ref 20–42)
MCH RBC QN AUTO: 29.5 PG (ref 26–35)
MCHC RBC AUTO-ENTMCNC: 30.1 % (ref 32–34.5)
MCV RBC AUTO: 98 FL (ref 80–99.9)
MONOCYTES ABSOLUTE: 0.81 E9/L (ref 0.1–0.95)
MONOCYTES RELATIVE PERCENT: 8 % (ref 2–12)
NEUTROPHILS ABSOLUTE: 7.34 E9/L (ref 1.8–7.3)
NEUTROPHILS RELATIVE PERCENT: 72.5 % (ref 43–80)
NITRITE, URINE: NEGATIVE
PDW BLD-RTO: 16.1 FL (ref 11.5–15)
PH UA: 7.5 (ref 5–9)
PLATELET # BLD: 189 E9/L (ref 130–450)
PMV BLD AUTO: 11 FL (ref 7–12)
POTASSIUM SERPL-SCNC: 5.4 MMOL/L (ref 3.5–5)
PROTEIN UA: >=300 MG/DL
RBC # BLD: 3.46 E12/L (ref 3.8–5.8)
RBC UA: ABNORMAL /HPF (ref 0–2)
SODIUM BLD-SCNC: 139 MMOL/L (ref 132–146)
SPECIFIC GRAVITY UA: 1.02 (ref 1–1.03)
TOTAL PROTEIN: 6.5 G/DL (ref 6.4–8.3)
TROPONIN: 0.09 NG/ML (ref 0–0.03)
TROPONIN: 0.09 NG/ML (ref 0–0.03)
UROBILINOGEN, URINE: 0.2 E.U./DL
WBC # BLD: 10.1 E9/L (ref 4.5–11.5)
WBC UA: ABNORMAL /HPF (ref 0–5)

## 2018-11-26 PROCEDURE — 2580000003 HC RX 258: Performed by: PHYSICIAN ASSISTANT

## 2018-11-26 PROCEDURE — 87077 CULTURE AEROBIC IDENTIFY: CPT

## 2018-11-26 PROCEDURE — 87088 URINE BACTERIA CULTURE: CPT

## 2018-11-26 PROCEDURE — 85025 COMPLETE CBC W/AUTO DIFF WBC: CPT

## 2018-11-26 PROCEDURE — 36415 COLL VENOUS BLD VENIPUNCTURE: CPT

## 2018-11-26 PROCEDURE — 84484 ASSAY OF TROPONIN QUANT: CPT

## 2018-11-26 PROCEDURE — 83690 ASSAY OF LIPASE: CPT

## 2018-11-26 PROCEDURE — 94761 N-INVAS EAR/PLS OXIMETRY MLT: CPT

## 2018-11-26 PROCEDURE — 71045 X-RAY EXAM CHEST 1 VIEW: CPT

## 2018-11-26 PROCEDURE — 2060000000 HC ICU INTERMEDIATE R&B

## 2018-11-26 PROCEDURE — 51702 INSERT TEMP BLADDER CATH: CPT

## 2018-11-26 PROCEDURE — 87186 SC STD MICRODIL/AGAR DIL: CPT

## 2018-11-26 PROCEDURE — 81001 URINALYSIS AUTO W/SCOPE: CPT

## 2018-11-26 PROCEDURE — 99285 EMERGENCY DEPT VISIT HI MDM: CPT

## 2018-11-26 PROCEDURE — 83605 ASSAY OF LACTIC ACID: CPT

## 2018-11-26 PROCEDURE — 80053 COMPREHEN METABOLIC PANEL: CPT

## 2018-11-26 PROCEDURE — 70450 CT HEAD/BRAIN W/O DYE: CPT

## 2018-11-26 RX ORDER — KETOROLAC TROMETHAMINE 5 MG/ML
1 SOLUTION OPHTHALMIC 4 TIMES DAILY
COMMUNITY
End: 2019-06-08

## 2018-11-26 RX ORDER — 0.9 % SODIUM CHLORIDE 0.9 %
1000 INTRAVENOUS SOLUTION INTRAVENOUS ONCE
Status: COMPLETED | OUTPATIENT
Start: 2018-11-26 | End: 2018-11-26

## 2018-11-26 RX ORDER — NYSTATIN 100000 [USP'U]/G
POWDER TOPICAL 4 TIMES DAILY
COMMUNITY
End: 2019-06-08

## 2018-11-26 RX ORDER — CINACALCET 30 MG/1
30 TABLET, FILM COATED ORAL DAILY
COMMUNITY

## 2018-11-26 RX ADMIN — SODIUM CHLORIDE 1000 ML: 9 INJECTION, SOLUTION INTRAVENOUS at 15:29

## 2018-11-26 NOTE — ED PROVIDER NOTES
Range    Troponin 0.09 (H) 0.00 - 0.03 ng/mL   Lactic Acid, Plasma   Result Value Ref Range    Lactic Acid 1.0 0.5 - 2.2 mmol/L   Microscopic Urinalysis   Result Value Ref Range    WBC, UA 5-10 0 - 5 /HPF    RBC, UA 0-1 0 - 2 /HPF    Bacteria, UA RARE (A) /HPF   Troponin   Result Value Ref Range    Troponin 0.09 (H) 0.00 - 0.03 ng/mL   Basic Metabolic Panel   Result Value Ref Range    Sodium 140 132 - 146 mmol/L    Potassium 5.3 (H) 3.5 - 5.0 mmol/L    Chloride 107 98 - 107 mmol/L    CO2 23 22 - 29 mmol/L    Anion Gap 10 7 - 16 mmol/L    Glucose 75 74 - 99 mg/dL    BUN 33 (H) 8 - 23 mg/dL    CREATININE 2.0 (H) 0.7 - 1.2 mg/dL    GFR Non-African American 33 >=60 mL/min/1.73    GFR African American 40     Calcium 10.0 8.6 - 10.2 mg/dL   Magnesium   Result Value Ref Range    Magnesium 1.9 1.6 - 2.6 mg/dL   TSH without Reflex   Result Value Ref Range    TSH 1.850 0.270 - 4.200 uIU/mL   Troponin   Result Value Ref Range    Troponin 0.09 (H) 0.00 - 0.03 ng/mL   Troponin   Result Value Ref Range    Troponin 0.07 (H) 0.00 - 0.03 ng/mL   Protime-INR   Result Value Ref Range    Protime 49.9 (H) 9.3 - 12.4 sec    INR 4.4    POCT Glucose   Result Value Ref Range    Meter Glucose 82 74 - 99 mg/dL   POCT Glucose   Result Value Ref Range    Meter Glucose 76 74 - 99 mg/dL   POCT Glucose   Result Value Ref Range    Meter Glucose 109 (H) 74 - 99 mg/dL   POCT Glucose   Result Value Ref Range    Meter Glucose 195 (H) 74 - 99 mg/dL   EKG 12 Lead   Result Value Ref Range    Ventricular Rate 47 BPM    Atrial Rate 44 BPM    QRS Duration 78 ms    Q-T Interval 476 ms    QTc Calculation (Bazett) 421 ms    R Axis 39 degrees    T Axis 34 degrees     Imaging: All Radiology results interpreted by Radiologist unless otherwise noted. CT Head WO Contrast   Final Result   Diffuse atrophy likely age related   Findings compatible with small vessel ischemic changes. XR CHEST PORTABLE   Final Result   1.  Stable, enlarged cardiac mediastinal 0211)   glucose (GLUTOSE) 40 % oral gel 15 g (not administered)   dextrose 50 % solution 12.5 g (not administered)   glucagon (rDNA) injection 1 mg (not administered)   dextrose 5 % solution (not administered)   hydrALAZINE (APRESOLINE) injection 10 mg (10 mg Intravenous Given 11/27/18 2222)   0.9 % sodium chloride bolus (0 mLs Intravenous Stopped 11/26/18 1608)        Re-Evaluations:  11/26/18      Time: 1800    Patients symptoms show no change. Time: 1930  Patient's symptoms show no change. The plan has been discussed. Consultations:             IP CONSULT TO CARDIOLOGY  IP CONSULT TO PODIATRY    Procedures:   none    MDM:  Patient will be admitted for further eval.     Counseling:   I have spoken with the patient and discussed todays results, in addition to providing specific details for the plan of care and counseling regarding the diagnosis and prognosis and are agreeable with the plan. Assessment      1. Bradycardia    2. Chronic renal failure, unspecified CKD stage    3. Elevated troponin    4. Generalized weakness      This patient's ED course included: a personal history and physicial examination, re-evaluation prior to disposition, IV medications, cardiac monitoring and continuous pulse oximetry  This patient has remained hemodynamically stable, improved and been closely monitored during their ED course. Plan   Admit to telemetry. Patient condition is fair. New Medications     Current Discharge Medication List        Electronically signed by Riya Sam PA-C   DD: 11/26/18  **This report was transcribed using voice recognition software. Every effort was made to ensure accuracy; however, inadvertent computerized transcription errors may be present.   END OF PROVIDER NOTE        Riya Sam PA-C  11/27/18 9092

## 2018-11-27 PROBLEM — E66.01 MORBID OBESITY (HCC): Chronic | Status: RESOLVED | Noted: 2018-11-26 | Resolved: 2018-11-27

## 2018-11-27 PROBLEM — N18.30 CKD (CHRONIC KIDNEY DISEASE) STAGE 3, GFR 30-59 ML/MIN (HCC): Chronic | Status: ACTIVE | Noted: 2018-11-27

## 2018-11-27 PROBLEM — E66.01 MORBID OBESITY WITH BMI OF 45.0-49.9, ADULT (HCC): Chronic | Status: ACTIVE | Noted: 2018-11-27

## 2018-11-27 PROBLEM — R00.1 BRADYCARDIA: Status: RESOLVED | Noted: 2018-11-27 | Resolved: 2018-11-27

## 2018-11-27 PROBLEM — K57.30 DIVERTICULOSIS OF COLON: Status: RESOLVED | Noted: 2018-04-05 | Resolved: 2018-11-27

## 2018-11-27 PROBLEM — R00.1 BRADYCARDIA: Status: ACTIVE | Noted: 2018-11-27

## 2018-11-27 LAB
ANION GAP SERPL CALCULATED.3IONS-SCNC: 10 MMOL/L (ref 7–16)
BUN BLDV-MCNC: 33 MG/DL (ref 8–23)
CALCIUM SERPL-MCNC: 10 MG/DL (ref 8.6–10.2)
CHLORIDE BLD-SCNC: 107 MMOL/L (ref 98–107)
CO2: 23 MMOL/L (ref 22–29)
CREAT SERPL-MCNC: 2 MG/DL (ref 0.7–1.2)
GFR AFRICAN AMERICAN: 40
GFR NON-AFRICAN AMERICAN: 33 ML/MIN/1.73
GLUCOSE BLD-MCNC: 75 MG/DL (ref 74–99)
INR BLD: 4.4
MAGNESIUM: 1.9 MG/DL (ref 1.6–2.6)
METER GLUCOSE: 109 MG/DL (ref 74–99)
METER GLUCOSE: 161 MG/DL (ref 74–99)
METER GLUCOSE: 195 MG/DL (ref 74–99)
METER GLUCOSE: 294 MG/DL (ref 74–99)
METER GLUCOSE: 76 MG/DL (ref 74–99)
METER GLUCOSE: 82 MG/DL (ref 74–99)
POTASSIUM SERPL-SCNC: 5.3 MMOL/L (ref 3.5–5)
PROTHROMBIN TIME: 49.9 SEC (ref 9.3–12.4)
SODIUM BLD-SCNC: 140 MMOL/L (ref 132–146)
TROPONIN: 0.07 NG/ML (ref 0–0.03)
TROPONIN: 0.09 NG/ML (ref 0–0.03)
TSH SERPL DL<=0.05 MIU/L-ACNC: 1.85 UIU/ML (ref 0.27–4.2)

## 2018-11-27 PROCEDURE — 80048 BASIC METABOLIC PNL TOTAL CA: CPT

## 2018-11-27 PROCEDURE — 97535 SELF CARE MNGMENT TRAINING: CPT

## 2018-11-27 PROCEDURE — 2060000000 HC ICU INTERMEDIATE R&B

## 2018-11-27 PROCEDURE — G8988 SELF CARE GOAL STATUS: HCPCS

## 2018-11-27 PROCEDURE — 83735 ASSAY OF MAGNESIUM: CPT

## 2018-11-27 PROCEDURE — 2580000003 HC RX 258: Performed by: INTERNAL MEDICINE

## 2018-11-27 PROCEDURE — 84443 ASSAY THYROID STIM HORMONE: CPT

## 2018-11-27 PROCEDURE — 97162 PT EVAL MOD COMPLEX 30 MIN: CPT

## 2018-11-27 PROCEDURE — 6370000000 HC RX 637 (ALT 250 FOR IP): Performed by: INTERNAL MEDICINE

## 2018-11-27 PROCEDURE — 36415 COLL VENOUS BLD VENIPUNCTURE: CPT

## 2018-11-27 PROCEDURE — 85610 PROTHROMBIN TIME: CPT

## 2018-11-27 PROCEDURE — G8979 MOBILITY GOAL STATUS: HCPCS

## 2018-11-27 PROCEDURE — 82962 GLUCOSE BLOOD TEST: CPT

## 2018-11-27 PROCEDURE — 6360000002 HC RX W HCPCS: Performed by: INTERNAL MEDICINE

## 2018-11-27 PROCEDURE — 97166 OT EVAL MOD COMPLEX 45 MIN: CPT

## 2018-11-27 PROCEDURE — G8987 SELF CARE CURRENT STATUS: HCPCS

## 2018-11-27 PROCEDURE — 99223 1ST HOSP IP/OBS HIGH 75: CPT | Performed by: INTERNAL MEDICINE

## 2018-11-27 PROCEDURE — G8980 MOBILITY D/C STATUS: HCPCS

## 2018-11-27 PROCEDURE — APPSS60 APP SPLIT SHARED TIME 46-60 MINUTES: Performed by: NURSE PRACTITIONER

## 2018-11-27 PROCEDURE — G8978 MOBILITY CURRENT STATUS: HCPCS

## 2018-11-27 PROCEDURE — 84484 ASSAY OF TROPONIN QUANT: CPT

## 2018-11-27 RX ORDER — ROPINIROLE 0.25 MG/1
0.25 TABLET, FILM COATED ORAL NIGHTLY
Status: DISCONTINUED | OUTPATIENT
Start: 2018-11-27 | End: 2018-11-28 | Stop reason: HOSPADM

## 2018-11-27 RX ORDER — CINACALCET 30 MG/1
30 TABLET, FILM COATED ORAL DAILY
Status: DISCONTINUED | OUTPATIENT
Start: 2018-11-27 | End: 2018-11-28 | Stop reason: HOSPADM

## 2018-11-27 RX ORDER — LEVOTHYROXINE SODIUM 0.03 MG/1
25 TABLET ORAL
Status: DISCONTINUED | OUTPATIENT
Start: 2018-11-27 | End: 2018-11-28 | Stop reason: HOSPADM

## 2018-11-27 RX ORDER — DEXTROSE MONOHYDRATE 25 G/50ML
12.5 INJECTION, SOLUTION INTRAVENOUS PRN
Status: DISCONTINUED | OUTPATIENT
Start: 2018-11-27 | End: 2018-11-28 | Stop reason: HOSPADM

## 2018-11-27 RX ORDER — INSULIN GLARGINE 100 [IU]/ML
25 INJECTION, SOLUTION SUBCUTANEOUS 2 TIMES DAILY
Status: DISCONTINUED | OUTPATIENT
Start: 2018-11-27 | End: 2018-11-28 | Stop reason: HOSPADM

## 2018-11-27 RX ORDER — HYDRALAZINE HYDROCHLORIDE 20 MG/ML
10 INJECTION INTRAMUSCULAR; INTRAVENOUS EVERY 6 HOURS PRN
Status: DISCONTINUED | OUTPATIENT
Start: 2018-11-27 | End: 2018-11-28 | Stop reason: HOSPADM

## 2018-11-27 RX ORDER — SERTRALINE HYDROCHLORIDE 100 MG/1
100 TABLET, FILM COATED ORAL DAILY
Status: DISCONTINUED | OUTPATIENT
Start: 2018-11-27 | End: 2018-11-28 | Stop reason: HOSPADM

## 2018-11-27 RX ORDER — ACETAMINOPHEN 325 MG/1
650 TABLET ORAL EVERY 4 HOURS PRN
Status: DISCONTINUED | OUTPATIENT
Start: 2018-11-27 | End: 2018-11-28 | Stop reason: HOSPADM

## 2018-11-27 RX ORDER — SODIUM CHLORIDE 0.9 % (FLUSH) 0.9 %
10 SYRINGE (ML) INJECTION EVERY 12 HOURS SCHEDULED
Status: DISCONTINUED | OUTPATIENT
Start: 2018-11-27 | End: 2018-11-28 | Stop reason: HOSPADM

## 2018-11-27 RX ORDER — PRAVASTATIN SODIUM 20 MG
20 TABLET ORAL DAILY
Status: DISCONTINUED | OUTPATIENT
Start: 2018-11-27 | End: 2018-11-28 | Stop reason: HOSPADM

## 2018-11-27 RX ORDER — WARFARIN SODIUM 3 MG/1
1.5 TABLET ORAL DAILY
Status: DISCONTINUED | OUTPATIENT
Start: 2018-11-27 | End: 2018-11-28 | Stop reason: HOSPADM

## 2018-11-27 RX ORDER — AMLODIPINE BESYLATE 5 MG/1
5 TABLET ORAL DAILY
Status: DISCONTINUED | OUTPATIENT
Start: 2018-11-27 | End: 2018-11-28 | Stop reason: HOSPADM

## 2018-11-27 RX ORDER — KETOROLAC TROMETHAMINE 5 MG/ML
1 SOLUTION OPHTHALMIC 4 TIMES DAILY
Status: DISCONTINUED | OUTPATIENT
Start: 2018-11-27 | End: 2018-11-28 | Stop reason: HOSPADM

## 2018-11-27 RX ORDER — SODIUM CHLORIDE 0.9 % (FLUSH) 0.9 %
10 SYRINGE (ML) INJECTION PRN
Status: DISCONTINUED | OUTPATIENT
Start: 2018-11-27 | End: 2018-11-28 | Stop reason: HOSPADM

## 2018-11-27 RX ORDER — DEXTROSE MONOHYDRATE 50 MG/ML
100 INJECTION, SOLUTION INTRAVENOUS PRN
Status: DISCONTINUED | OUTPATIENT
Start: 2018-11-27 | End: 2018-11-28 | Stop reason: HOSPADM

## 2018-11-27 RX ORDER — DIVALPROEX SODIUM 125 MG/1
125 TABLET, DELAYED RELEASE ORAL 3 TIMES DAILY
Status: DISCONTINUED | OUTPATIENT
Start: 2018-11-27 | End: 2018-11-28 | Stop reason: HOSPADM

## 2018-11-27 RX ORDER — DAPSONE 25 MG/1
25 TABLET ORAL NIGHTLY
Status: DISCONTINUED | OUTPATIENT
Start: 2018-11-27 | End: 2018-11-28 | Stop reason: HOSPADM

## 2018-11-27 RX ORDER — ASPIRIN 81 MG/1
81 TABLET, CHEWABLE ORAL DAILY
Status: DISCONTINUED | OUTPATIENT
Start: 2018-11-27 | End: 2018-11-28 | Stop reason: HOSPADM

## 2018-11-27 RX ORDER — PREDNISONE 1 MG/1
2.5 TABLET ORAL DAILY
Status: DISCONTINUED | OUTPATIENT
Start: 2018-11-27 | End: 2018-11-28 | Stop reason: HOSPADM

## 2018-11-27 RX ORDER — DAPSONE 25 MG/1
50 TABLET ORAL DAILY
Status: DISCONTINUED | OUTPATIENT
Start: 2018-11-27 | End: 2018-11-28 | Stop reason: HOSPADM

## 2018-11-27 RX ORDER — FAMOTIDINE 20 MG/1
20 TABLET, FILM COATED ORAL 2 TIMES DAILY
Status: DISCONTINUED | OUTPATIENT
Start: 2018-11-27 | End: 2018-11-28 | Stop reason: DRUGHIGH

## 2018-11-27 RX ORDER — NICOTINE POLACRILEX 4 MG
15 LOZENGE BUCCAL PRN
Status: DISCONTINUED | OUTPATIENT
Start: 2018-11-27 | End: 2018-11-28 | Stop reason: HOSPADM

## 2018-11-27 RX ADMIN — ROPINIROLE HYDROCHLORIDE 0.25 MG: 0.25 TABLET, FILM COATED ORAL at 20:48

## 2018-11-27 RX ADMIN — CINACALCET HYDROCHLORIDE 30 MG: 30 TABLET, COATED ORAL at 08:23

## 2018-11-27 RX ADMIN — HYDRALAZINE HYDROCHLORIDE 10 MG: 20 INJECTION INTRAMUSCULAR; INTRAVENOUS at 08:23

## 2018-11-27 RX ADMIN — KETOROLAC TROMETHAMINE 1 DROP: 5 SOLUTION OPHTHALMIC at 20:50

## 2018-11-27 RX ADMIN — LEVOTHYROXINE SODIUM 25 MCG: 25 TABLET ORAL at 06:41

## 2018-11-27 RX ADMIN — KETOROLAC TROMETHAMINE 1 DROP: 5 SOLUTION OPHTHALMIC at 12:06

## 2018-11-27 RX ADMIN — PRAVASTATIN SODIUM 20 MG: 20 TABLET ORAL at 08:24

## 2018-11-27 RX ADMIN — INSULIN GLARGINE 25 UNITS: 100 INJECTION, SOLUTION SUBCUTANEOUS at 20:49

## 2018-11-27 RX ADMIN — AMLODIPINE BESYLATE 5 MG: 5 TABLET ORAL at 08:23

## 2018-11-27 RX ADMIN — CARBIDOPA AND LEVODOPA 1 TABLET: 25; 100 TABLET ORAL at 13:49

## 2018-11-27 RX ADMIN — FAMOTIDINE 20 MG: 20 TABLET, FILM COATED ORAL at 08:24

## 2018-11-27 RX ADMIN — DAPSONE 50 MG: 25 TABLET ORAL at 08:25

## 2018-11-27 RX ADMIN — DIVALPROEX SODIUM 125 MG: 125 TABLET, DELAYED RELEASE ORAL at 20:48

## 2018-11-27 RX ADMIN — INSULIN LISPRO 3 UNITS: 100 INJECTION, SOLUTION INTRAVENOUS; SUBCUTANEOUS at 20:49

## 2018-11-27 RX ADMIN — DIVALPROEX SODIUM 125 MG: 125 TABLET, DELAYED RELEASE ORAL at 13:49

## 2018-11-27 RX ADMIN — FAMOTIDINE 20 MG: 20 TABLET, FILM COATED ORAL at 20:48

## 2018-11-27 RX ADMIN — DAPSONE 25 MG: 25 TABLET ORAL at 20:49

## 2018-11-27 RX ADMIN — KETOROLAC TROMETHAMINE 1 DROP: 5 SOLUTION OPHTHALMIC at 08:28

## 2018-11-27 RX ADMIN — INSULIN LISPRO 2 UNITS: 100 INJECTION, SOLUTION INTRAVENOUS; SUBCUTANEOUS at 12:07

## 2018-11-27 RX ADMIN — KETOROLAC TROMETHAMINE 1 DROP: 5 SOLUTION OPHTHALMIC at 16:18

## 2018-11-27 RX ADMIN — PREDNISONE 2.5 MG: 5 TABLET ORAL at 08:25

## 2018-11-27 RX ADMIN — CARBIDOPA AND LEVODOPA 1 TABLET: 25; 100 TABLET ORAL at 20:48

## 2018-11-27 RX ADMIN — SERTRALINE 100 MG: 100 TABLET, FILM COATED ORAL at 08:25

## 2018-11-27 RX ADMIN — Medication 10 ML: at 08:25

## 2018-11-27 RX ADMIN — Medication 10 ML: at 20:50

## 2018-11-27 RX ADMIN — ASPIRIN 81 MG 81 MG: 81 TABLET ORAL at 08:25

## 2018-11-27 RX ADMIN — CARBIDOPA AND LEVODOPA 1 TABLET: 25; 100 TABLET ORAL at 08:23

## 2018-11-27 RX ADMIN — INSULIN LISPRO 2 UNITS: 100 INJECTION, SOLUTION INTRAVENOUS; SUBCUTANEOUS at 16:06

## 2018-11-27 RX ADMIN — DIVALPROEX SODIUM 125 MG: 125 TABLET, DELAYED RELEASE ORAL at 08:24

## 2018-11-27 ASSESSMENT — PAIN SCALES - GENERAL
PAINLEVEL_OUTOF10: 0

## 2018-11-27 NOTE — CONSULTS
per day  sodium chloride flush 0.9 % injection 10 mL, 10 mL, Intravenous, PRN  trimethobenzamide (TIGAN) injection 200 mg, 200 mg, Intramuscular, Q6H PRN  insulin lispro (HUMALOG) injection vial 0-12 Units, 0-12 Units, Subcutaneous, TID WC  insulin lispro (HUMALOG) injection vial 0-6 Units, 0-6 Units, Subcutaneous, Nightly  glucose (GLUTOSE) 40 % oral gel 15 g, 15 g, Oral, PRN  dextrose 50 % solution 12.5 g, 12.5 g, Intravenous, PRN  glucagon (rDNA) injection 1 mg, 1 mg, Intramuscular, PRN  dextrose 5 % solution, 100 mL/hr, Intravenous, PRN  hydrALAZINE (APRESOLINE) injection 10 mg, 10 mg, Intravenous, Q6H PRN    Allergies:  Patient has no known allergies. Social History:    ? Tobacco - Former Smoker. Quit 2015. Smoked 2 PPD x 40 years. ? ETOH - Denies  ? Illicit - Denies  ? Caffeine - Multiple cups of coffee daily  ? Lives in nursing home  ? Code Status - DNR-CCA      Family History:   ? Mother - Alive - HTN  ? Father - passed from cancer      REVIEW OF SYSTEMS:     · Constitutional: Denies fevers, chills, night sweats, and + for fatigue,   · HEENT: Denies headaches, nose bleeds, and blurred vision,oral pain, abscess or lesion. · Musculoskeletal: Denies falls, pain to BLE with ambulation and + for  edema to BLE that is stable in nature. · Neurological: Denies dizziness and lightheadedness, + for  numbness and tingling in BLE  · Cardiovascular: Denies chest pain, palpitations, and feelings of heart racing. · Respiratory: Denies orthopnea and PND  · Gastrointestinal: Denies heartburn, nausea/vomiting, diarrhea and constipation, black/bloody, and tarry stools. + for anorexia  · Genitourinary: Denies dysuria and hematuria  · Hematologic: Denies excessive bruising or bleeding  · Lymphatic: Denies lumps and bumps to neck, axilla, breast, and groin  · Endocrine: Denies excessive thirst. Denies intolerance to hot and cold  · Psychiatric: Denies anxiety and depression.     PHYSICAL EXAM:   BP (!) 178/84 Comment: with small vessel ischemic changes. Intake/Output Summary (Last 24 hours) at 11/27/18 0948  Last data filed at 11/27/18 0513   Gross per 24 hour   Intake               30 ml   Output              700 ml   Net             -670 ml       Labs:   CBC:   Recent Labs      11/26/18   1525   WBC  10.1   HGB  10.2*   HCT  33.9*   PLT  189     BMP: Recent Labs      11/26/18   1525  11/27/18   0239   NA  139  140   K  5.4*  5.3*   CO2  22  23   BUN  34*  33*   CREATININE  1.9*  2.0*   LABGLOM  35  33   CALCIUM  9.7  10.0     Mag:   Recent Labs      11/27/18   0239   MG  1.9     TSH:   Recent Labs      11/27/18   0239   TSH  1.850     HgA1c:   Lab Results   Component Value Date    LABA1C 5.3 10/22/2017     PT/INR:   Recent Labs      11/27/18   0239   PROTIME  49.9*   INR  4.4     CARDIAC ENZYMES:  Recent Labs      11/26/18   1955  11/27/18   0239  11/27/18   0806   TROPONINI  0.09*  0.09*  0.07*     FASTING LIPID PANEL:  Lab Results   Component Value Date    CHOL 84 06/03/2017    HDL 24 06/03/2017    LDLCALC 39 06/03/2017    TRIG 104 06/03/2017     LIVER PROFILE:  Recent Labs      11/26/18   1525   AST  9   ALT  <5   LABALBU  3.4*     Impression and plan per Dr. Khadar Bell   Electronically signed by SHAILESH Hzd - CNP on 11/27/2018 at 9:48 AM     I personally and independently saw and examined patient and reviewed all done pertinent laboratory data, imaging studies, ECGs and rhythm strips. I have reviewed and agree with the APN history and physical exam as documented in the above note. Electronically signed by Brittney John MD on 11/28/2018 at 12:26 PM     We were asked to see the patient for bradycardia     IMPRESSION:  1. Bradycardia: was on BB therapy at the nursing home: held on admission with improvement in HR. No indication for a pace maker  2.  H/o SVT  3. Echo 5/2017 with mild cLVH, normal wall motion and systolic function, stage I DD, mild pulmonary HTN and no valvular abnormalitiesunremarkable

## 2018-11-27 NOTE — H&P
[address] [facility]  Attending History and Physical      CHIEF COMPLAINT:  weakness      HISTORY OF PRESENT ILLNESS:      The patient is a 75 y.o. male patient of dr Stephanie rizo who presents with complains of weakness. Patient lives in a nursing home. He is wheelchair bound. He leads a very sedentary life. Nonetheless, he was sent to the ED for weakness. Patient is awake and alert. He offers no complaints. He denies chest pain, shortness of breath, abdominal pain, nausea, vomiting, fevers, chills and diaphoresis. Past Medical History:    Past Medical History:   Diagnosis Date    Acute gastritis without hemorrhage 4/5/2018    Cellulitis     left leg    Chronic kidney disease     stage 3    Diabetes mellitus ([facility]     type 2    Diverticulosis of colon 4/5/2018    DVT of leg (deep venous thrombosis) (HCC)     left    Gait abnormality     Hx of blood clots     Hyperlipidemia     Hypertension     Obesity     Polyneuropathy     Restless leg syndrome     Sepsis ([facility]     Smoker        Past Surgical History:    Past Surgical History:   Procedure Laterality Date    KNEE SURGERY      left    [facility] COLONOSCOPY FLX DX W/COLLJ SPEC WHEN PFRMD N/A 4/4/2018    COLONOSCOPY DIAGNOSTIC --TIME UNDETERMINED performed by Frances Salinas MD at [facility] EGD TRANSORAL BIOPSY SINGLE/MULTIPLE N/A 4/4/2018    EGD BIOPSY performed by Frances Salinas MD at [facility] ENDOSCOPY       Medications Prior to Admission:    Prescriptions Prior to Admission: nystatin (NYSTATIN) 450626 UNIT/GM powder, Apply topically 4 times daily Apply topically 4 times daily. cinacalcet (SENSIPAR) 30 MG tablet, Take 30 mg by mouth daily  aspirin 81 MG tablet, Take 81 mg by mouth daily  halobetasol (ULTRAVATE) 0.05 % cream, Apply topically 2 times daily Apply topically 2 times daily.   ketorolac (ACULAR) 0.5 % ophthalmic solution, Place 1 drop into both eyes 4 times daily  Neomycin-Polymyxin-Dexameth (MAXITROL) History:   Family history is unknown by patient. REVIEW OF SYSTEMS:  As above in the HPI, otherwise negative    PHYSICAL EXAM:    Vitals:  BP (!) 178/84 Comment: manual  Pulse 51   Temp 97.7 °F (36.5 °C) (Oral)   Resp 16   Ht 5' 6\" (1.676 m)   Wt 300 lb (136.1 kg)   SpO2 96%   BMI 48.42 kg/m²     General:  Awake, alert, oriented X 3. Well developed, well nourished, well groomed. No apparent distress. HEENT:  Normocephalic, atraumatic. Pupils equal, round, reactive to light. No scleral icterus. No conjunctival injection. Normal lips, teeth, and gums. No nasal discharge. Neck:  Supple  Heart:  RRR, no murmurs, gallops, rubs  Lungs:  CTA bilaterally, bilat symmetrical expansion, no wheeze, rales, or rhonchi  Abdomen: Morbidly obese. Bowel sounds present, soft, nontender, no masses, no organomegaly, no peritoneal signs  Extremities:  No clubbing, cyanosis, or edema  Skin:  Foot ulcer  Neuro:  Cranial nerves 2-12 intact. Severe physical deconditioning.     Breast: deferred  Rectal: deferred  Genitalia:  deferred    LABS:    CBC with Differential:    Lab Results   Component Value Date    WBC 10.1 11/26/2018    RBC 3.46 11/26/2018    HGB 10.2 11/26/2018    HCT 33.9 11/26/2018     11/26/2018    MCV 98.0 11/26/2018    MCH 29.5 11/26/2018    MCHC 30.1 11/26/2018    RDW 16.1 11/26/2018    SEGSPCT 68 01/23/2012    LYMPHOPCT 13.7 11/26/2018    MONOPCT 8.0 11/26/2018    BASOPCT 1.3 11/26/2018    MONOSABS 0.81 11/26/2018    LYMPHSABS 1.39 11/26/2018    EOSABS 0.40 11/26/2018    BASOSABS 0.13 11/26/2018     CMP:    Lab Results   Component Value Date     11/27/2018    K 5.3 11/27/2018     11/27/2018    CO2 23 11/27/2018    BUN 33 11/27/2018    CREATININE 2.0 11/27/2018    GFRAA 40 11/27/2018    LABGLOM 33 11/27/2018    GLUCOSE 75 11/27/2018    GLUCOSE 173 01/23/2012    PROT 6.5 11/26/2018    LABALBU 3.4 11/26/2018    LABALBU 4.1 01/22/2012    CALCIUM 10.0 11/27/2018    BILITOT 0.5 11/26/2018 ALKPHOS 87 11/26/2018    AST 9 11/26/2018    ALT <5 11/26/2018     BMP:    Lab Results   Component Value Date     11/27/2018    K 5.3 11/27/2018     11/27/2018    CO2 23 11/27/2018    BUN 33 11/27/2018    LABALBU 3.4 11/26/2018    LABALBU 4.1 01/22/2012    CREATININE 2.0 11/27/2018    CALCIUM 10.0 11/27/2018    GFRAA 40 11/27/2018    LABGLOM 33 11/27/2018    GLUCOSE 75 11/27/2018    GLUCOSE 173 01/23/2012     Magnesium:    Lab Results   Component Value Date    MG 1.9 11/27/2018     Phosphorus:    Lab Results   Component Value Date    PHOS 2.4 04/05/2018     PT/INR:    Lab Results   Component Value Date    PROTIME 49.9 11/27/2018    PROTIME 18.8 01/22/2012    INR 4.4 11/27/2018     PTT:    Lab Results   Component Value Date    APTT 34.3 06/02/2017   [APTT}  Troponin:    Lab Results   Component Value Date    TROPONINI 0.07 11/27/2018     Last 3 Troponin:    Lab Results   Component Value Date    TROPONINI 0.07 11/27/2018    TROPONINI 0.09 11/27/2018    TROPONINI 0.09 11/26/2018     U/A:    Lab Results   Component Value Date    COLORU Yellow 11/26/2018    PROTEINU >=300 11/26/2018    PHUR 7.5 11/26/2018    WBCUA 5-10 11/26/2018    RBCUA 0-1 11/26/2018    BACTERIA RARE 11/26/2018    CLARITYU Clear 11/26/2018    SPECGRAV 1.020 11/26/2018    LEUKOCYTESUR TRACE 11/26/2018    UROBILINOGEN 0.2 11/26/2018    BILIRUBINUR Negative 11/26/2018    BLOODU SMALL 11/26/2018    GLUCOSEU 100 11/26/2018    AMORPHOUS FEW 10/21/2017     HgBA1c:    Lab Results   Component Value Date    LABA1C 5.3 10/22/2017     FLP:    Lab Results   Component Value Date    TRIG 104 06/03/2017    HDL 24 06/03/2017    LDLCALC 39 06/03/2017    LABVLDL 21 06/03/2017     TSH:    Lab Results   Component Value Date    TSH 1.850 11/27/2018       ASSESSMENT:      Patient Active Problem List   Diagnosis    History of DVT (deep vein thrombosis)    Diabetes mellitus type 2, uncontrolled (HCC)    Hyperlipidemia LDL goal <100    Acquired hypothyroidism

## 2018-11-27 NOTE — ED NOTES
Patient states he does not walk, uses a wheelchair at [facility], [address] [place] [address]  11/26/18 2121

## 2018-11-28 ENCOUNTER — APPOINTMENT (OUTPATIENT)
Dept: GENERAL RADIOLOGY | Age: 68
DRG: 309 | End: 2018-11-28
Payer: COMMERCIAL

## 2018-11-28 VITALS
DIASTOLIC BLOOD PRESSURE: 80 MMHG | WEIGHT: 300 LBS | RESPIRATION RATE: 18 BRPM | OXYGEN SATURATION: 95 % | SYSTOLIC BLOOD PRESSURE: 164 MMHG | HEIGHT: 66 IN | BODY MASS INDEX: 48.21 KG/M2 | HEART RATE: 63 BPM | TEMPERATURE: 98 F

## 2018-11-28 LAB
ALBUMIN SERPL-MCNC: 3.1 G/DL (ref 3.5–5.2)
ALP BLD-CCNC: 90 U/L (ref 40–129)
ALT SERPL-CCNC: <5 U/L (ref 0–40)
ANION GAP SERPL CALCULATED.3IONS-SCNC: 14 MMOL/L (ref 7–16)
AST SERPL-CCNC: 9 U/L (ref 0–39)
BASOPHILS ABSOLUTE: 0.07 E9/L (ref 0–0.2)
BASOPHILS RELATIVE PERCENT: 0.8 % (ref 0–2)
BILIRUB SERPL-MCNC: 0.5 MG/DL (ref 0–1.2)
BUN BLDV-MCNC: 35 MG/DL (ref 8–23)
CALCIUM SERPL-MCNC: 10.1 MG/DL (ref 8.6–10.2)
CHLORIDE BLD-SCNC: 110 MMOL/L (ref 98–107)
CO2: 21 MMOL/L (ref 22–29)
CREAT SERPL-MCNC: 2.2 MG/DL (ref 0.7–1.2)
EOSINOPHILS ABSOLUTE: 0.49 E9/L (ref 0.05–0.5)
EOSINOPHILS RELATIVE PERCENT: 5.3 % (ref 0–6)
GFR AFRICAN AMERICAN: 36
GFR NON-AFRICAN AMERICAN: 30 ML/MIN/1.73
GLUCOSE BLD-MCNC: 173 MG/DL (ref 74–99)
HCT VFR BLD CALC: 33.1 % (ref 37–54)
HEMOGLOBIN: 10 G/DL (ref 12.5–16.5)
IMMATURE GRANULOCYTES #: 0.06 E9/L
IMMATURE GRANULOCYTES %: 0.7 % (ref 0–5)
INR BLD: 4
LYMPHOCYTES ABSOLUTE: 1.25 E9/L (ref 1.5–4)
LYMPHOCYTES RELATIVE PERCENT: 13.6 % (ref 20–42)
MAGNESIUM: 2 MG/DL (ref 1.6–2.6)
MCH RBC QN AUTO: 29.5 PG (ref 26–35)
MCHC RBC AUTO-ENTMCNC: 30.2 % (ref 32–34.5)
MCV RBC AUTO: 97.6 FL (ref 80–99.9)
METER GLUCOSE: 158 MG/DL (ref 74–99)
METER GLUCOSE: 169 MG/DL (ref 74–99)
METER GLUCOSE: 225 MG/DL (ref 74–99)
MONOCYTES ABSOLUTE: 0.91 E9/L (ref 0.1–0.95)
MONOCYTES RELATIVE PERCENT: 9.9 % (ref 2–12)
NEUTROPHILS ABSOLUTE: 6.42 E9/L (ref 1.8–7.3)
NEUTROPHILS RELATIVE PERCENT: 69.7 % (ref 43–80)
ORGANISM: ABNORMAL
PDW BLD-RTO: 16 FL (ref 11.5–15)
PLATELET # BLD: 191 E9/L (ref 130–450)
PMV BLD AUTO: 11.3 FL (ref 7–12)
POTASSIUM SERPL-SCNC: 5.1 MMOL/L (ref 3.5–5)
PROTHROMBIN TIME: 45.1 SEC (ref 9.3–12.4)
RBC # BLD: 3.39 E12/L (ref 3.8–5.8)
SODIUM BLD-SCNC: 145 MMOL/L (ref 132–146)
TOTAL PROTEIN: 6.4 G/DL (ref 6.4–8.3)
URINE CULTURE, ROUTINE: ABNORMAL
URINE CULTURE, ROUTINE: ABNORMAL
WBC # BLD: 9.2 E9/L (ref 4.5–11.5)

## 2018-11-28 PROCEDURE — 85610 PROTHROMBIN TIME: CPT

## 2018-11-28 PROCEDURE — 73630 X-RAY EXAM OF FOOT: CPT

## 2018-11-28 PROCEDURE — 83735 ASSAY OF MAGNESIUM: CPT

## 2018-11-28 PROCEDURE — 82962 GLUCOSE BLOOD TEST: CPT

## 2018-11-28 PROCEDURE — 2580000003 HC RX 258: Performed by: INTERNAL MEDICINE

## 2018-11-28 PROCEDURE — 36415 COLL VENOUS BLD VENIPUNCTURE: CPT

## 2018-11-28 PROCEDURE — 85025 COMPLETE CBC W/AUTO DIFF WBC: CPT

## 2018-11-28 PROCEDURE — 80053 COMPREHEN METABOLIC PANEL: CPT

## 2018-11-28 PROCEDURE — 6370000000 HC RX 637 (ALT 250 FOR IP): Performed by: INTERNAL MEDICINE

## 2018-11-28 RX ORDER — FAMOTIDINE 20 MG/1
20 TABLET, FILM COATED ORAL DAILY
Status: DISCONTINUED | OUTPATIENT
Start: 2018-11-29 | End: 2018-11-28 | Stop reason: HOSPADM

## 2018-11-28 RX ADMIN — KETOROLAC TROMETHAMINE 1 DROP: 5 SOLUTION OPHTHALMIC at 08:11

## 2018-11-28 RX ADMIN — AMLODIPINE BESYLATE 5 MG: 5 TABLET ORAL at 08:11

## 2018-11-28 RX ADMIN — INSULIN LISPRO 2 UNITS: 100 INJECTION, SOLUTION INTRAVENOUS; SUBCUTANEOUS at 08:10

## 2018-11-28 RX ADMIN — CARBIDOPA AND LEVODOPA 1 TABLET: 25; 100 TABLET ORAL at 08:11

## 2018-11-28 RX ADMIN — ASPIRIN 81 MG 81 MG: 81 TABLET ORAL at 08:11

## 2018-11-28 RX ADMIN — Medication 10 ML: at 08:44

## 2018-11-28 RX ADMIN — PREDNISONE 2.5 MG: 5 TABLET ORAL at 08:10

## 2018-11-28 RX ADMIN — LEVOTHYROXINE SODIUM 25 MCG: 25 TABLET ORAL at 06:39

## 2018-11-28 RX ADMIN — DIVALPROEX SODIUM 125 MG: 125 TABLET, DELAYED RELEASE ORAL at 08:11

## 2018-11-28 RX ADMIN — DAPSONE 50 MG: 25 TABLET ORAL at 08:11

## 2018-11-28 RX ADMIN — CARBIDOPA AND LEVODOPA 1 TABLET: 25; 100 TABLET ORAL at 13:01

## 2018-11-28 RX ADMIN — DIVALPROEX SODIUM 125 MG: 125 TABLET, DELAYED RELEASE ORAL at 13:01

## 2018-11-28 RX ADMIN — PRAVASTATIN SODIUM 20 MG: 20 TABLET ORAL at 08:10

## 2018-11-28 RX ADMIN — INSULIN LISPRO 4 UNITS: 100 INJECTION, SOLUTION INTRAVENOUS; SUBCUTANEOUS at 11:45

## 2018-11-28 RX ADMIN — KETOROLAC TROMETHAMINE 1 DROP: 5 SOLUTION OPHTHALMIC at 13:02

## 2018-11-28 RX ADMIN — INSULIN GLARGINE 25 UNITS: 100 INJECTION, SOLUTION SUBCUTANEOUS at 06:47

## 2018-11-28 RX ADMIN — FAMOTIDINE 20 MG: 20 TABLET, FILM COATED ORAL at 08:11

## 2018-11-28 RX ADMIN — SERTRALINE 100 MG: 100 TABLET, FILM COATED ORAL at 08:10

## 2018-11-28 RX ADMIN — CINACALCET HYDROCHLORIDE 30 MG: 30 TABLET, COATED ORAL at 08:12

## 2018-11-28 ASSESSMENT — PAIN SCALES - GENERAL
PAINLEVEL_OUTOF10: 0

## 2018-11-28 NOTE — DISCHARGE SUMMARY
Physician Discharge Summary     Patient ID:  Mercedes Vega  [id number]  75 y.o.  1950    Admit date: 11/26/2018    Discharge date and time:  11/28/2018    Admission Diagnoses:   Patient Active Problem List   Diagnosis    History of DVT (deep vein thrombosis)    Diabetes mellitus type 2, uncontrolled ([facility]    Hyperlipidemia LDL goal <100    Acquired hypothyroidism    Anemia of chronic disease    Bradycardia    Morbid obesity with BMI of 45.0-49.9, adult ([facility]    CKD (chronic kidney disease) stage 3, GFR 30-59 ml/min ([facility]       Discharge Diagnoses: as above    Consults: cardiology and podiatry    Procedures: see chart    Hospital Course: patient was admitted with weakness. He was found to have a right foot wound. He was seen by podiatry and wound care. Continued conservative dressing changes and wound care was recommended. He was bradycardic. Cardiology evaluated. His beta blocker (metoprolol) was discontinued. He was discharged back to his rehab facility in stable condition. Discharge Exam:  See progress note from today    Condition:  stable    Disposition: SNF    Patient Instructions:   Current Discharge Medication List      CONTINUE these medications which have NOT CHANGED    Details   nystatin (NYSTATIN) 534542 UNIT/GM powder Apply topically 4 times daily Apply topically 4 times daily. cinacalcet (SENSIPAR) 30 MG tablet Take 30 mg by mouth daily      aspirin 81 MG tablet Take 81 mg by mouth daily      halobetasol (ULTRAVATE) 0.05 % cream Apply topically 2 times daily Apply topically 2 times daily.       ketorolac (ACULAR) 0.5 % ophthalmic solution Place 1 drop into both eyes 4 times daily      Neomycin-Polymyxin-Dexameth (MAXITROL) 0.1 % OINT Apply 0.5 inches to eye 3 times daily Both eyes get one ribbon TID      carbidopa-levodopa (SINEMET)  MG per tablet Take 1 tablet by mouth 3 times daily      sertraline (ZOLOFT) 100 MG tablet Take 100 mg by mouth daily      insulin detemir (LEVEMIR) 100 UNIT/ML injection vial Inject 25 Units into the skin 2 times daily      rOPINIRole (REQUIP) 0.25 MG tablet Take 0.25 mg by mouth nightly      divalproex (DEPAKOTE) 125 MG DR tablet Take 125 mg by mouth 3 times daily      amLODIPine (NORVASC) 5 MG tablet Take 1 tablet by mouth daily  Qty: 30 tablet, Refills: 0      warfarin (COUMADIN) 3 MG tablet Take 1.5 mg by mouth daily       dapsone 25 MG tablet Take 25 mg by mouth daily       predniSONE (DELTASONE) 10 MG tablet Take 2.5 mg by mouth daily       famotidine (PEPCID) 20 MG tablet Take 1 tablet by mouth 2 times daily  Qty: 60 tablet, Refills: 3      acetaminophen (TYLENOL) 325 MG tablet Take 650 mg by mouth every 4 hours as needed for Pain or Fever       levothyroxine (SYNTHROID) 25 MCG tablet Take 25 mcg by mouth every morning       pravastatin (PRAVACHOL) 20 MG tablet Take 20 mg by mouth daily       insulin aspart (NOVOLOG) 100 UNIT/ML injection cartridge Inject into the skin 3 times daily (before meals) Indications: sliding scale 6:30 AM, 11:30 AM, 4:30 PM  <60 Follow Hypoglycemia protocol   =0 units   131-180 =4 units    181-240 =8 units    241-300 =10 units    301-350 =12 units   351-400 =16 units    Greater than 400 =20 units and call MD      vitamin D (ERGOCALCIFEROL) 91030 UNITS CAPS capsule Take 50,000 Units by mouth once a week Mondays  @ 8:30 AM         STOP taking these medications       metoprolol tartrate (LOPRESSOR) 50 MG tablet Comments:   Reason for Stopping:         insulin glargine (LANTUS) 100 UNIT/ML injection vial Comments:   Reason for Stopping:             Activity: activity as tolerated  Diet: low fat, low cholesterol diet    Follow up with dr Urszula rizo in 1 week. Follow up with dr Geovanni awad in 2-3 weeks. Follow up with dr Candido Merlin in 2-3 weeks.       Note that over 30 minutes was spent in preparing discharge papers, discussing discharge with patient, medication review, etc.    Signed:  Teresa Graves

## 2018-11-28 NOTE — DISCHARGE INSTR - COC
R00.1    Morbid obesity with BMI of 45.0-49.9, adult ([facility]) E66.01, Z68.42    CKD (chronic kidney disease) stage 3, GFR 30-59 ml/min ([facility]) N18.3       Isolation/Infection:   Isolation          No Isolation            Nurse Assessment:  Last Vital Signs: BP (!) 164/80   Pulse 63   Temp 98 °F (36.7 °C) (Temporal)   Resp 18   Ht 5' 6\" (1.676 m)   Wt 300 lb (136.1 kg)   SpO2 95%   BMI 48.42 kg/m²     Last documented pain score (0-10 scale): Pain Level: 0  Last Weight:   Wt Readings from Last 1 Encounters:   11/26/18 300 lb (136.1 kg)     Mental Status:  alert    IV Access:  - None    Nursing Mobility/ADLs:  Walking   Dependent  Transfer  Dependent  Bathing  Dependent  Dressing  Dependent  Toileting  Dependent  Feeding  Assisted  Med Admin  Assisted  Med Delivery   whole    Wound Care Documentation and Therapy:  Wound 06/04/17 Blister Leg Right (Active)   Number of days: 124       Wound 06/08/17 Foot Right; Outer; Lateral;Lower (Active)   Number of days: 538       Wound (Active)   Number of days:        Wound 06/05/17 Blister Knee Left; Outer (Active)   Number of days: 540       Wound 06/08/17 Foot Inner;Right (Active)   Number of days: 537       Wound 06/08/17 Blister Elbow Left (Active)   Number of days: 537       Wound 10/21/17 Leg Right; Lower (Active)   Number of days: 402       Wound 10/28/17 Skin tear Hand Posterior circular (Active)   Number of days: 396       Wound 04/03/18 Skin tear Arm Right (Active)   Number of days: 239       Wound 04/05/18 Skin tear Arm Left (Active)   Number of days: 237       Wound 11/27/18 Other (Comment) Toe (Comment  which one) Other (Comment) Deep cut toes pulling away from foot (Active)   Dressing Status Changed 11/27/2018  4:23 PM   Dressing Changed Changed/New 11/27/2018  4:23 PM   Dressing/Treatment 4x4;Other (Comment);ABD 11/28/2018  7:45 AM   Wound Cleansed Rinsed/Irrigated with saline 11/27/2018  4:23 PM   Wound Assessment Bleeding 11/27/2018  4:23 PM   Drainage Amount Large 11/27/2018  4:23 PM   Drainage Description Sanguinous 11/27/2018  4:23 PM   Number of days: 1       Wound 11/27/18 Skin tear Other (Comment) Left Small L shin skin tear 1cm x 1cm (Active)   Dressing/Treatment Open to air 11/28/2018  7:45 AM   Number of days: 1       Wound 11/27/18 Blister Other (Comment) Left unopened blister  (Active)   Dressing Status Clean;Dry; Intact 11/28/2018  7:45 AM   Dressing/Treatment Open to air 11/28/2018  7:45 AM   Number of days: 1        Elimination:  Continence:   · Bowel: Yes  · Bladder: No  Urinary Catheter: 11/26/2018   Colostomy/Ileostomy/Ileal Conduit: No       Date of Last BM: 11/28/2018    Intake/Output Summary (Last 24 hours) at 11/28/18 1346  Last data filed at 11/28/18 0653   Gross per 24 hour   Intake              320 ml   Output             2275 ml   Net            -1955 ml     I/O last 3 completed shifts: In: 620 [P.O.:620]  Out: 2275 [Urine:2275]    Safety Concerns: At Risk for Falls    Impairments/Disabilities:      None    Nutrition Therapy:  Current Nutrition Therapy:   - Oral Diet:  General    Routes of Feeding: Oral  Liquids: No Restrictions  Daily Fluid Restriction: no  Last Modified Barium Swallow with Video (Video Swallowing Test): not done    Treatments at the Time of Hospital Discharge:   Respiratory Treatments:   Oxygen Therapy:  is not on home oxygen therapy. Ventilator:    - No ventilator support    Rehab Therapies: Physical Therapy and Occupational Therapy  Weight Bearing Status/Restrictions: No weight bearing restirctions  Other Medical Equipment (for information only, NOT a DME order):    Other Treatments: ***    Patient's personal belongings (please select all that are sent with patient):  {[facility] DME Belongings:[id number]}    RN SIGNATURE:  Electronically signed by Nikia Gibbons RN on 11/28/18 at 1:54 PM    CASE MANAGEMENT/SOCIAL WORK SECTION    Inpatient Status Date: ***    Readmission Risk Assessment Score:  Readmission Risk

## 2018-11-28 NOTE — PROGRESS NOTES
Dr Adrian Bianchi notified per office of consult.   Pt added to census
Your patient is on a medication that requires a renal dose adjustment. Renal Function Assessment:    Date Body Weight IBW Adj. Body Weight Scr CrCl Dialysis status   11/28/2018 136.1 kg 63.8 kg 92.72 kg 2.2 42 ml/min        Pharmacy has renally dose-adjusted the following medication(s):    Date Medication Original Dosing Regimen New Dosing Regimen   11/28/2018 Famotidine 20mg BID 20mg QD           These changes were made per protocol according to the [facility] Renal Function-Based Dose Adjustments Policy    *Please note this dose may need readjusted if your patient's renal function significantly improves. Please contact pharmacy with any questions regarding these changes.     Thank you,  Alma Patton, PharmD 11/28/2018 10:18 AM
for ADLs [x]  Splinting Needs []   Positioning to improve overall function [x]   Therapeutic Activity [x]  Therapeutic Exercise  [x]  Visual/Perceptual: []    Delirium prevention/treatment  []   Other:  []    Rehab Potential: Good for established goals     Patient / Family Goal: Not stated     Patient and/or family were instructed diagnosis, prognosis/goals and plan of care. Demonstrated fair- understanding. [] Malnutrition indicators have been identified and nursing has been notified to ensure a dietitian consult is ordered.        AM-PAC Daily Activity Inpatient   How much help for putting on and taking off regular lower body clothing?: Total  How much help for Bathing?: A Lot  How much help for Toileting?: Total  How much help for putting on and taking off regular upper body clothing?: A Lot  How much help for taking care of personal grooming?: A Little  How much help for eating meals?: A Little  AM-[facility] Inpatient Daily Activity Raw Score: 12  AM-PAC Inpatient ADL T-Scale Score : 30.6  ADL Inpatient CMS 0-100% Score: 66.57  ADL Inpatient CMS G-Code Modifier : CL       mod Evaluation + 10 timed treatment minutes  Tx Time in: 1115  Tx Time out: 1010 [facility] OTR/L #[id number]

## 2018-11-29 ENCOUNTER — TELEPHONE (OUTPATIENT)
Dept: ADMINISTRATIVE | Age: 68
End: 2018-11-29

## 2018-11-30 LAB
EKG ATRIAL RATE: 44 BPM
EKG Q-T INTERVAL: 476 MS
EKG QRS DURATION: 78 MS
EKG QTC CALCULATION (BAZETT): 421 MS
EKG R AXIS: 39 DEGREES
EKG T AXIS: 34 DEGREES
EKG VENTRICULAR RATE: 47 BPM

## 2018-12-27 ENCOUNTER — HOSPITAL ENCOUNTER (EMERGENCY)
Age: 68
Discharge: HOME | End: 2018-12-28
Attending: EMERGENCY MEDICINE
Payer: COMMERCIAL

## 2018-12-27 ENCOUNTER — APPOINTMENT (OUTPATIENT)
Dept: GENERAL RADIOLOGY | Age: 68
End: 2018-12-27
Payer: COMMERCIAL

## 2018-12-27 DIAGNOSIS — S81.812A LACERATION OF LEFT LOWER EXTREMITY, INITIAL ENCOUNTER: Primary | ICD-10-CM

## 2018-12-27 PROCEDURE — 90471 IMMUNIZATION ADMIN: CPT | Performed by: EMERGENCY MEDICINE

## 2018-12-27 PROCEDURE — 90715 TDAP VACCINE 7 YRS/> IM: CPT | Performed by: EMERGENCY MEDICINE

## 2018-12-27 PROCEDURE — 99282 EMERGENCY DEPT VISIT SF MDM: CPT

## 2018-12-27 PROCEDURE — 73590 X-RAY EXAM OF LOWER LEG: CPT

## 2018-12-27 PROCEDURE — 6370000000 HC RX 637 (ALT 250 FOR IP): Performed by: EMERGENCY MEDICINE

## 2018-12-27 PROCEDURE — 6360000002 HC RX W HCPCS: Performed by: EMERGENCY MEDICINE

## 2018-12-27 PROCEDURE — 12002 RPR S/N/AX/GEN/TRNK2.6-7.5CM: CPT

## 2018-12-27 PROCEDURE — 2500000003 HC RX 250 WO HCPCS: Performed by: EMERGENCY MEDICINE

## 2018-12-27 RX ORDER — CEPHALEXIN 500 MG/1
500 CAPSULE ORAL 4 TIMES DAILY
Qty: 28 CAPSULE | Refills: 0 | Status: SHIPPED | OUTPATIENT
Start: 2018-12-27 | End: 2019-01-03

## 2018-12-27 RX ORDER — DIAPER,BRIEF,INFANT-TODD,DISP
EACH MISCELLANEOUS ONCE
Status: COMPLETED | OUTPATIENT
Start: 2018-12-27 | End: 2018-12-27

## 2018-12-27 RX ORDER — LIDOCAINE HYDROCHLORIDE AND EPINEPHRINE 10; 10 MG/ML; UG/ML
20 INJECTION, SOLUTION INFILTRATION; PERINEURAL ONCE
Status: COMPLETED | OUTPATIENT
Start: 2018-12-27 | End: 2018-12-27

## 2018-12-27 RX ADMIN — BACITRACIN ZINC: 500 OINTMENT TOPICAL at 23:58

## 2018-12-27 RX ADMIN — TETANUS TOXOID, REDUCED DIPHTHERIA TOXOID AND ACELLULAR PERTUSSIS VACCINE, ADSORBED 0.5 ML: 5; 2.5; 8; 8; 2.5 SUSPENSION INTRAMUSCULAR at 22:10

## 2018-12-27 RX ADMIN — LIDOCAINE HYDROCHLORIDE,EPINEPHRINE BITARTRATE 20 ML: 10; .01 INJECTION, SOLUTION INFILTRATION; PERINEURAL at 22:30

## 2018-12-28 VITALS
HEIGHT: 66 IN | DIASTOLIC BLOOD PRESSURE: 65 MMHG | RESPIRATION RATE: 18 BRPM | WEIGHT: 300 LBS | OXYGEN SATURATION: 97 % | HEART RATE: 70 BPM | BODY MASS INDEX: 48.21 KG/M2 | TEMPERATURE: 98.1 F | SYSTOLIC BLOOD PRESSURE: 138 MMHG

## 2018-12-28 NOTE — ED NOTES
This nurse to speak with KLG at this time.   Per [facility] dispatch, requesting pt information to be faxed and they will be in touch regarding pt approval for transport     Laureen Rashid RN  12/28/18 2359

## 2018-12-28 NOTE — ED PROVIDER NOTES
12/27/18. RADIOLOGY:  Interpreted by Radiologist.  XR TIBIA FIBULA LEFT (2 VIEWS)   Final Result   1. Multiple artifacts overlying the soft tissues . 2. Cannot identified the an acute fracture in the left tibia and   fibula. 3. Generalized osteopenia.          ------------------------- NURSING NOTES AND VITALS REVIEWED ---------------------------   The nursing notes within the ED encounter and vital signs as below have been reviewed. /65   Pulse 70   Temp 98.1 °F (36.7 °C)   Resp 18   Ht 5' 6\" (1.676 m)   Wt 300 lb (136.1 kg)   SpO2 97%   BMI 48.42 kg/m²   Oxygen Saturation Interpretation: Normal    ---------------------------------------------------PHYSICAL EXAM--------------------------------------    Constitutional/General: Alert and oriented x3, well appearing, non toxic in NAD  Head: NC/AT  Eyes: PERRL, EOMI  Mouth: Oropharynx clear, handling secretions, no trismus  Neck: Supple, full ROM,   Pulmonary: Lungs clear to auscultation bilaterally, no wheezes, rales, or rhonchi. Not in respiratory distress  Cardiovascular:  Regular rate and rhythm, no murmurs, gallops, or rubs. 2+ distal pulses  Abdomen: Soft, non tender, non distended,   Extremities: Moves all extremities x 4. Warm and well perfused. Bilateral pitting edema to LE. Chronic stasis changes of LE. Skin: Warm and dry. 3.5 cm laceration to proximal anterior shin with active oozing. Neurologic: GCS 15  Psych: Normal Affect      ------------------------------ ED COURSE/MEDICAL DECISION MAKING----------------------  Medications   Tetanus-Diphth-Acell Pertussis (BOOSTRIX) injection 0.5 mL (0.5 mLs Intramuscular Given 12/27/18 2210)   lidocaine-EPINEPHrine 1 percent-1:025254 injection 20 mL (20 mLs Intradermal Given by Other 12/27/18 2230)   bacitracin zinc ointment ( Topical Given by Other 12/27/18 1099)       Medical Decision Making: Arley Pare presents with laceration to the left shin. Tetanus updated.  XR shows no

## 2019-06-08 ENCOUNTER — HOSPITAL ENCOUNTER (INPATIENT)
Age: 69
LOS: 5 days | Discharge: NURSING, REHAB OR LONG-TERM CARE | DRG: 436 | End: 2019-06-13
Attending: EMERGENCY MEDICINE | Admitting: INTERNAL MEDICINE
Payer: COMMERCIAL

## 2019-06-08 ENCOUNTER — APPOINTMENT (OUTPATIENT)
Dept: CT IMAGING | Age: 69
DRG: 436 | End: 2019-06-08
Payer: COMMERCIAL

## 2019-06-08 DIAGNOSIS — K92.2 GASTROINTESTINAL HEMORRHAGE, UNSPECIFIED GASTROINTESTINAL HEMORRHAGE TYPE: ICD-10-CM

## 2019-06-08 DIAGNOSIS — R79.1 SUPRATHERAPEUTIC INR: ICD-10-CM

## 2019-06-08 DIAGNOSIS — N17.9 AKI (ACUTE KIDNEY INJURY) (HCC): ICD-10-CM

## 2019-06-08 DIAGNOSIS — C78.7 METASTATIC CANCER TO LIVER (HCC): Primary | ICD-10-CM

## 2019-06-08 LAB
ABO/RH: NORMAL
ALBUMIN SERPL-MCNC: 3.5 G/DL (ref 3.5–5.2)
ALP BLD-CCNC: 268 U/L (ref 40–129)
ALT SERPL-CCNC: <5 U/L (ref 0–40)
ANION GAP SERPL CALCULATED.3IONS-SCNC: 13 MMOL/L (ref 7–16)
ANTIBODY SCREEN: NORMAL
AST SERPL-CCNC: 16 U/L (ref 0–39)
BASOPHILS ABSOLUTE: 0.07 E9/L (ref 0–0.2)
BASOPHILS RELATIVE PERCENT: 0.8 % (ref 0–2)
BILIRUB SERPL-MCNC: 0.2 MG/DL (ref 0–1.2)
BUN BLDV-MCNC: 67 MG/DL (ref 8–23)
CALCIUM SERPL-MCNC: 11.7 MG/DL (ref 8.6–10.2)
CHLORIDE BLD-SCNC: 106 MMOL/L (ref 98–107)
CO2: 20 MMOL/L (ref 22–29)
CREAT SERPL-MCNC: 3.3 MG/DL (ref 0.7–1.2)
EOSINOPHILS ABSOLUTE: 0.21 E9/L (ref 0.05–0.5)
EOSINOPHILS RELATIVE PERCENT: 2.4 % (ref 0–6)
GFR AFRICAN AMERICAN: 23
GFR NON-AFRICAN AMERICAN: 19 ML/MIN/1.73
GLUCOSE BLD-MCNC: 152 MG/DL (ref 74–99)
HCT VFR BLD CALC: 30.2 % (ref 37–54)
HEMOGLOBIN: 9.1 G/DL (ref 12.5–16.5)
IMMATURE GRANULOCYTES #: 0.18 E9/L
IMMATURE GRANULOCYTES %: 2 % (ref 0–5)
INR BLD: 8.7
LACTIC ACID: 2.9 MMOL/L (ref 0.5–2.2)
LIPASE: 100 U/L (ref 13–60)
LYMPHOCYTES ABSOLUTE: 1.42 E9/L (ref 1.5–4)
LYMPHOCYTES RELATIVE PERCENT: 16 % (ref 20–42)
MCH RBC QN AUTO: 26.9 PG (ref 26–35)
MCHC RBC AUTO-ENTMCNC: 30.1 % (ref 32–34.5)
MCV RBC AUTO: 89.3 FL (ref 80–99.9)
METER GLUCOSE: 166 MG/DL (ref 74–99)
MONOCYTES ABSOLUTE: 0.76 E9/L (ref 0.1–0.95)
MONOCYTES RELATIVE PERCENT: 8.6 % (ref 2–12)
NEUTROPHILS ABSOLUTE: 6.22 E9/L (ref 1.8–7.3)
NEUTROPHILS RELATIVE PERCENT: 70.2 % (ref 43–80)
PDW BLD-RTO: 17.2 FL (ref 11.5–15)
PLATELET # BLD: 210 E9/L (ref 130–450)
PMV BLD AUTO: 11.2 FL (ref 7–12)
POTASSIUM SERPL-SCNC: 5.2 MMOL/L (ref 3.5–5)
PROTHROMBIN TIME: 94.9 SEC (ref 9.3–12.4)
RBC # BLD: 3.38 E12/L (ref 3.8–5.8)
SODIUM BLD-SCNC: 139 MMOL/L (ref 132–146)
TOTAL PROTEIN: 7.9 G/DL (ref 6.4–8.3)
TROPONIN: 0.09 NG/ML (ref 0–0.03)
WBC # BLD: 8.9 E9/L (ref 4.5–11.5)

## 2019-06-08 PROCEDURE — 84484 ASSAY OF TROPONIN QUANT: CPT

## 2019-06-08 PROCEDURE — 96374 THER/PROPH/DIAG INJ IV PUSH: CPT

## 2019-06-08 PROCEDURE — 2580000003 HC RX 258: Performed by: NURSE PRACTITIONER

## 2019-06-08 PROCEDURE — 6360000002 HC RX W HCPCS: Performed by: NURSE PRACTITIONER

## 2019-06-08 PROCEDURE — 2140000000 HC CCU INTERMEDIATE R&B

## 2019-06-08 PROCEDURE — 85610 PROTHROMBIN TIME: CPT

## 2019-06-08 PROCEDURE — 36415 COLL VENOUS BLD VENIPUNCTURE: CPT

## 2019-06-08 PROCEDURE — 82962 GLUCOSE BLOOD TEST: CPT

## 2019-06-08 PROCEDURE — 85025 COMPLETE CBC W/AUTO DIFF WBC: CPT

## 2019-06-08 PROCEDURE — 6370000000 HC RX 637 (ALT 250 FOR IP): Performed by: INTERNAL MEDICINE

## 2019-06-08 PROCEDURE — 86901 BLOOD TYPING SEROLOGIC RH(D): CPT

## 2019-06-08 PROCEDURE — 99285 EMERGENCY DEPT VISIT HI MDM: CPT

## 2019-06-08 PROCEDURE — 6370000000 HC RX 637 (ALT 250 FOR IP): Performed by: NURSE PRACTITIONER

## 2019-06-08 PROCEDURE — 74176 CT ABD & PELVIS W/O CONTRAST: CPT

## 2019-06-08 PROCEDURE — 93005 ELECTROCARDIOGRAM TRACING: CPT | Performed by: NURSE PRACTITIONER

## 2019-06-08 PROCEDURE — 83605 ASSAY OF LACTIC ACID: CPT

## 2019-06-08 PROCEDURE — 80053 COMPREHEN METABOLIC PANEL: CPT

## 2019-06-08 PROCEDURE — 83690 ASSAY OF LIPASE: CPT

## 2019-06-08 PROCEDURE — 2580000003 HC RX 258: Performed by: INTERNAL MEDICINE

## 2019-06-08 PROCEDURE — 86850 RBC ANTIBODY SCREEN: CPT

## 2019-06-08 PROCEDURE — 86900 BLOOD TYPING SEROLOGIC ABO: CPT

## 2019-06-08 RX ORDER — LANOLIN ALCOHOL/MO/W.PET/CERES
5 CREAM (GRAM) TOPICAL NIGHTLY PRN
COMMUNITY

## 2019-06-08 RX ORDER — INSULIN GLARGINE 100 [IU]/ML
10 INJECTION, SOLUTION SUBCUTANEOUS 2 TIMES DAILY
Status: DISCONTINUED | OUTPATIENT
Start: 2019-06-08 | End: 2019-06-13 | Stop reason: HOSPADM

## 2019-06-08 RX ORDER — AMLODIPINE BESYLATE 5 MG/1
5 TABLET ORAL DAILY
Status: DISCONTINUED | OUTPATIENT
Start: 2019-06-08 | End: 2019-06-13 | Stop reason: HOSPADM

## 2019-06-08 RX ORDER — ACETAMINOPHEN 325 MG/1
650 TABLET ORAL EVERY 4 HOURS PRN
Status: DISCONTINUED | OUTPATIENT
Start: 2019-06-08 | End: 2019-06-13 | Stop reason: HOSPADM

## 2019-06-08 RX ORDER — DAPSONE 25 MG/1
25 TABLET ORAL DAILY
Status: DISCONTINUED | OUTPATIENT
Start: 2019-06-08 | End: 2019-06-13 | Stop reason: HOSPADM

## 2019-06-08 RX ORDER — NICOTINE POLACRILEX 4 MG
15 LOZENGE BUCCAL PRN
Status: DISCONTINUED | OUTPATIENT
Start: 2019-06-08 | End: 2019-06-13 | Stop reason: HOSPADM

## 2019-06-08 RX ORDER — PRAVASTATIN SODIUM 20 MG
20 TABLET ORAL DAILY
Status: DISCONTINUED | OUTPATIENT
Start: 2019-06-09 | End: 2019-06-13 | Stop reason: HOSPADM

## 2019-06-08 RX ORDER — FAMOTIDINE 20 MG/1
20 TABLET, FILM COATED ORAL DAILY
Status: DISCONTINUED | OUTPATIENT
Start: 2019-06-09 | End: 2019-06-13 | Stop reason: HOSPADM

## 2019-06-08 RX ORDER — ONDANSETRON 2 MG/ML
4 INJECTION INTRAMUSCULAR; INTRAVENOUS ONCE
Status: COMPLETED | OUTPATIENT
Start: 2019-06-08 | End: 2019-06-08

## 2019-06-08 RX ORDER — SODIUM CHLORIDE 9 MG/ML
INJECTION, SOLUTION INTRAVENOUS CONTINUOUS
Status: DISCONTINUED | OUTPATIENT
Start: 2019-06-08 | End: 2019-06-09

## 2019-06-08 RX ORDER — MAGNESIUM HYDROXIDE/ALUMINUM HYDROXICE/SIMETHICONE 120; 1200; 1200 MG/30ML; MG/30ML; MG/30ML
20 SUSPENSION ORAL EVERY 4 HOURS PRN
COMMUNITY

## 2019-06-08 RX ORDER — LEVOTHYROXINE SODIUM 0.03 MG/1
25 TABLET ORAL EVERY MORNING
Status: DISCONTINUED | OUTPATIENT
Start: 2019-06-09 | End: 2019-06-13 | Stop reason: HOSPADM

## 2019-06-08 RX ORDER — NEOMYCIN SULFATE, POLYMYXIN B SULFATE, AND DEXAMETHASONE 3.5; 10000; 1 MG/G; [USP'U]/G; MG/G
0.5 OINTMENT OPHTHALMIC 3 TIMES DAILY
Status: ON HOLD | COMMUNITY
End: 2019-06-13 | Stop reason: HOSPADM

## 2019-06-08 RX ORDER — ROPINIROLE 0.25 MG/1
0.25 TABLET, FILM COATED ORAL NIGHTLY
Status: DISCONTINUED | OUTPATIENT
Start: 2019-06-08 | End: 2019-06-13 | Stop reason: HOSPADM

## 2019-06-08 RX ORDER — DEXTROSE MONOHYDRATE 50 MG/ML
100 INJECTION, SOLUTION INTRAVENOUS PRN
Status: DISCONTINUED | OUTPATIENT
Start: 2019-06-08 | End: 2019-06-13 | Stop reason: HOSPADM

## 2019-06-08 RX ORDER — ERGOCALCIFEROL 1.25 MG/1
50000 CAPSULE ORAL WEEKLY
Status: DISCONTINUED | OUTPATIENT
Start: 2019-06-10 | End: 2019-06-13 | Stop reason: HOSPADM

## 2019-06-08 RX ORDER — SERTRALINE HYDROCHLORIDE 100 MG/1
100 TABLET, FILM COATED ORAL DAILY
Status: DISCONTINUED | OUTPATIENT
Start: 2019-06-09 | End: 2019-06-13 | Stop reason: HOSPADM

## 2019-06-08 RX ORDER — 0.9 % SODIUM CHLORIDE 0.9 %
500 INTRAVENOUS SOLUTION INTRAVENOUS ONCE
Status: COMPLETED | OUTPATIENT
Start: 2019-06-08 | End: 2019-06-08

## 2019-06-08 RX ORDER — 0.9 % SODIUM CHLORIDE 0.9 %
250 INTRAVENOUS SOLUTION INTRAVENOUS ONCE
Status: DISCONTINUED | OUTPATIENT
Start: 2019-06-08 | End: 2019-06-13 | Stop reason: HOSPADM

## 2019-06-08 RX ORDER — DEXTROSE MONOHYDRATE 25 G/50ML
12.5 INJECTION, SOLUTION INTRAVENOUS PRN
Status: DISCONTINUED | OUTPATIENT
Start: 2019-06-08 | End: 2019-06-13 | Stop reason: HOSPADM

## 2019-06-08 RX ORDER — PREDNISONE 1 MG/1
2.5 TABLET ORAL DAILY
Status: DISCONTINUED | OUTPATIENT
Start: 2019-06-09 | End: 2019-06-13 | Stop reason: HOSPADM

## 2019-06-08 RX ORDER — CINACALCET 30 MG/1
30 TABLET, FILM COATED ORAL DAILY
Status: DISCONTINUED | OUTPATIENT
Start: 2019-06-09 | End: 2019-06-13 | Stop reason: HOSPADM

## 2019-06-08 RX ORDER — DIVALPROEX SODIUM 125 MG/1
125 TABLET, DELAYED RELEASE ORAL 3 TIMES DAILY
Status: DISCONTINUED | OUTPATIENT
Start: 2019-06-08 | End: 2019-06-13 | Stop reason: HOSPADM

## 2019-06-08 RX ORDER — DAPSONE 25 MG/1
50 TABLET ORAL DAILY
Status: DISCONTINUED | OUTPATIENT
Start: 2019-06-09 | End: 2019-06-13 | Stop reason: HOSPADM

## 2019-06-08 RX ORDER — PHYTONADIONE 5 MG/1
5 TABLET ORAL ONCE
Status: COMPLETED | OUTPATIENT
Start: 2019-06-08 | End: 2019-06-08

## 2019-06-08 RX ADMIN — SODIUM CHLORIDE: 9 INJECTION, SOLUTION INTRAVENOUS at 21:17

## 2019-06-08 RX ADMIN — ONDANSETRON HYDROCHLORIDE 4 MG: 2 SOLUTION INTRAMUSCULAR; INTRAVENOUS at 17:15

## 2019-06-08 RX ADMIN — PHYTONADIONE 5 MG: 5 TABLET ORAL at 19:33

## 2019-06-08 RX ADMIN — ROPINIROLE HYDROCHLORIDE 0.25 MG: 0.25 TABLET, FILM COATED ORAL at 22:58

## 2019-06-08 RX ADMIN — CARBIDOPA AND LEVODOPA 1 TABLET: 25; 100 TABLET ORAL at 22:58

## 2019-06-08 RX ADMIN — DIVALPROEX SODIUM 125 MG: 125 TABLET, DELAYED RELEASE ORAL at 22:58

## 2019-06-08 RX ADMIN — SODIUM CHLORIDE 500 ML: 9 INJECTION, SOLUTION INTRAVENOUS at 17:15

## 2019-06-08 ASSESSMENT — PAIN SCALES - GENERAL
PAINLEVEL_OUTOF10: 0
PAINLEVEL_OUTOF10: 0

## 2019-06-08 NOTE — ED NOTES
Bed: 08  Expected date:   Expected time:   Means of arrival:   Comments:  101 Chintan Perez RN  06/08/19 0169

## 2019-06-08 NOTE — ED NOTES
Harsh Nicholson from lab called re: pt critical INR at 8.68. Will notify physician. Will continue to monitor pt.      Prabhakar Donovan RN  06/08/19 [id number]

## 2019-06-08 NOTE — PROGRESS NOTES
Pharmacist in the chart for extended period of time   Unable to see or address meds in this patient from a nursing home

## 2019-06-08 NOTE — ED NOTES
Central Monitoring@ 1945  Spoke with: Karrie Singleton PHOEULALIA [facility] MAINE Juan  06/08/19 1946

## 2019-06-08 NOTE — ED PROVIDER NOTES
ED Attending  CC: Michelle         Department of Emergency Medicine   ED  Provider Note  Admit Date/RoomTime: 6/8/2019  4:45 PM  ED Room: 08/08  MRN: [id number]  Chief Complaint:   Abnormal Lab (sent from NH for INR of 9)       History of Present Illness   Source of history provided by:  patient and nursing home. History/Exam Limitations: mental status. Krissy Felton is a 70 y.o. male who has a past medical history of:   Past Medical History:   Diagnosis Date    Acute gastritis without hemorrhage 4/5/2018    Cellulitis     left leg    Chronic kidney disease     stage 3    Diabetes mellitus ([facility]     type 2    Diverticulosis of colon 4/5/2018    DVT of leg (deep venous thrombosis) (HCC)     left    Gait abnormality     Hx of blood clots     Hyperlipidemia     Hypertension     Obesity     Polyneuropathy     Restless leg syndrome     Sepsis ([facility]     Smoker     presents to the ED by ambulance for elevated INR of 9.0 that has been ongoing despite withholding his Coumadin and aspirin. Additionally the nursing home reports that he's had a 13 pound weight loss over one month. Patient states he just doesn't feel right in his stomach and is better described as nausea without vomiting and he believes his stools are black. He reports eating and drinking well as well as no urinary symptoms, constipation or diarrhea. Additionally he denies any syncope, chest pain, shortness of breath, back pain, numbness, weakness or associated traumatic incident. The complaint has been persistent, moderate in severity. ROS   Pertinent positives and negatives are stated within HPI, all other systems reviewed and are negative.        Past Surgical History:   Procedure Laterality Date    KNEE SURGERY      left    [place] COLONOSCOPY FLX DX W/COLLJ SPEC WHEN PFRMD N/A 4/4/2018    COLONOSCOPY DIAGNOSTIC --TIME UNDETERMINED performed by Xin Stevenson MD at [address] [place] [address] EGD TRANSORAL BIOPSY SINGLE/MULTIPLE N/A 4/4/2018 EGD BIOPSY performed by Severiano Cunning, MD at [address] [place] [address] History:  reports that he quit smoking about 4 years ago. His smoking use included cigarettes. He has a 80.00 pack-year smoking history. He has never used smokeless tobacco. He reports that he does not drink alcohol or use drugs. Family History: Family history is unknown by patient. Allergies: Patient has no known allergies. Physical Exam Section   Oxygen Saturation Interpretation: Normal.   ED Triage Vitals [06/08/19 1648]   BP Temp Temp Source Pulse Resp SpO2 Height Weight   112/69 97.9 °F (36.6 °C) Temporal 88 18 98 % 5' 6\" (1.676 m) 250 lb (113.4 kg)     Physical Exam  · Constitutional/General: Alert and oriented x3, well appearing, non toxic. · HEENT:  NC/NT. Airway patent. Oral mucosa dry. · Respiratory: Lungs clear to auscultation bilaterally, no wheezes, rales, or rhonchi. Not in respiratory distress  · CV:  Regular rate. Regular rhythm. No murmurs, gallops, or rubs. 2+ distal pulses. No resting tachycardia. · GI:  Abdomen Soft, non-tender, non distended. +BS. No rebound, guarding, or rigidity. No pulsatile masses. Hemoccult positive, brown stool, no external hemorrhoid. · Musculoskeletal: Moves all extremities x 4. Warm and well perfused, no clubbing, cyanosis, or edema. Capillary refill <3 seconds  · Integument: skin pale, warm and dry. No rashes.    · Lymphatic: no lymphadenopathy noted  · Neurologic: GCS 14, no focal deficits, symmetric strength 4/5 in the upper and lower extremities bilaterally  · Psychiatric: Normal Affect  ·   Lab / Imaging Results   (All laboratory and radiology results have been personally reviewed by myself)  Labs:  Results for orders placed or performed during the hospital encounter of 06/08/19   Comprehensive Metabolic Panel   Result Value Ref Range    Sodium 139 132 - 146 mmol/L    Potassium 5.2 (H) 3.5 - 5.0 mmol/L    Chloride 106 98 - 107 mmol/L    CO2 20 (L) 22 - 29 mmol/L    Anion Gap 13 7 - 16 disease with very large size lesion is   present throughout the left lobe in particular and also in the right   lobe. 2. Additional prominent metastatic adenopathy are seen in the   periportal spaces/celiac axis. 3. Also metastatic adenopathy in lesser degree are present in the   midline kasey aortic retroperitoneum spaces. 4. Extensive uncomplicated diverticulosis seen in the left-sided   colon. No signs for acute diverticulitis. ALERT:  ABNORMAL REPORT. EKG #1:  Interpreted by emergency department physician unless otherwise noted. Time:  1716    Rate: 80  Rhythm: Sinus. Interpretation: sinus rhythm without acute T wave changes. ED Course / Medical Decision Making     Medications   0.9 % sodium chloride bolus (has no administration in time range)   phytonadione (VITAMIN K) tablet 5 mg (has no administration in time range)   0.9 % sodium chloride bolus (0 mLs Intravenous Stopped 6/8/19 1852)   ondansetron (ZOFRAN) injection 4 mg (4 mg Intravenous Given 6/8/19 1715)        Re-Evaluations:  6/8/19      Time: 0876    Patients symptoms show no change. Patient evaluated by Dr. Fátima Iverson and he advised the patient about the abnormal findings on CT which he believes to be new. Consultations:             IP CONSULT TO HOSPITALIST   Time: 1910 Dr. Fátima Iverson spoke to Dr. Scott Palacio who accepts the patient for admission. Procedures:   none    MDM:  Patient evaluated by Dr. Fátima Iverson. Labs and diagnostics as resulted above. INR 8.7 with hemoccult positive stool. He was ordered fresh frozen plasma as well as oral vitamin K given his supratherapeutic INR with associated bleeding. CT is interpreted by radiologist with what appears to be new findings of metastatic liver CA. Patient was made aware of this. Nursing home documentation states that he is a DNR CCA.  Given these findings in addition to acute kidney injury and other abnormal labs, he is appropriate for inpatient treatment and further evaluation as arranged with the hospitalist.    Counseling:   I have spoken with the patient and discussed todays results, in addition to providing specific details for the plan of care and counseling regarding the diagnosis and prognosis and are agreeable with the plan. Assessment      1. Metastatic cancer to liver ([facility]    2. Supratherapeutic INR    3. Gastrointestinal hemorrhage, unspecified gastrointestinal hemorrhage type    4. JANETH (acute kidney injury) ([facility]      This patient's ED course included: a personal history and physicial examination, re-evaluation prior to disposition and multiple bedside re-evaluations  This patient has remained hemodynamically stable and been closely monitored during their ED course. Plan   Admit to telemetry. Patient condition is stable. New Medications     New Prescriptions    No medications on file     Electronically signed by SHAILESH Virk CNP   DD: 6/8/19  **This report was transcribed using voice recognition software. Every effort was made to ensure accuracy; however, inadvertent computerized transcription errors may be present.   END OF PROVIDER NOTE       SHAILESH Virk CNP  06/08/19 [id number]

## 2019-06-09 PROBLEM — C78.7 METASTASES TO THE LIVER (HCC): Status: ACTIVE | Noted: 2019-06-09

## 2019-06-09 PROBLEM — D68.32 WARFARIN-INDUCED COAGULOPATHY (HCC): Status: ACTIVE | Noted: 2019-06-09

## 2019-06-09 PROBLEM — T45.515A WARFARIN-INDUCED COAGULOPATHY (HCC): Status: ACTIVE | Noted: 2019-06-09

## 2019-06-09 PROBLEM — E66.01 MORBID OBESITY WITH BMI OF 45.0-49.9, ADULT (HCC): Status: RESOLVED | Noted: 2018-11-27 | Resolved: 2019-06-09

## 2019-06-09 PROBLEM — E66.01 MORBID OBESITY WITH BMI OF 45.0-49.9, ADULT (HCC): Status: ACTIVE | Noted: 2018-11-27

## 2019-06-09 LAB
ANION GAP SERPL CALCULATED.3IONS-SCNC: 12 MMOL/L (ref 7–16)
ANISOCYTOSIS: ABNORMAL
BACTERIA: ABNORMAL /HPF
BASOPHILS ABSOLUTE: 0.05 E9/L (ref 0–0.2)
BASOPHILS RELATIVE PERCENT: 0.7 % (ref 0–2)
BILIRUBIN URINE: NEGATIVE
BLOOD, URINE: ABNORMAL
BUN BLDV-MCNC: 60 MG/DL (ref 8–23)
CALCIUM SERPL-MCNC: 11.4 MG/DL (ref 8.6–10.2)
CEA: 3.6 NG/ML (ref 0–5.2)
CHLORIDE BLD-SCNC: 105 MMOL/L (ref 98–107)
CHLORIDE URINE RANDOM: 40 MMOL/L
CLARITY: CLEAR
CO2: 18 MMOL/L (ref 22–29)
COLOR: YELLOW
CREAT SERPL-MCNC: 2.8 MG/DL (ref 0.7–1.2)
CREATININE URINE: 48 MG/DL (ref 40–278)
EKG ATRIAL RATE: 80 BPM
EKG P AXIS: 77 DEGREES
EKG P-R INTERVAL: 196 MS
EKG Q-T INTERVAL: 350 MS
EKG QRS DURATION: 78 MS
EKG QTC CALCULATION (BAZETT): 403 MS
EKG R AXIS: 6 DEGREES
EKG T AXIS: 86 DEGREES
EKG VENTRICULAR RATE: 80 BPM
EOSINOPHILS ABSOLUTE: 0.21 E9/L (ref 0.05–0.5)
EOSINOPHILS RELATIVE PERCENT: 3.1 % (ref 0–6)
FERRITIN: 1086 NG/ML
FOLATE: 6.8 NG/ML (ref 4.8–24.2)
GFR AFRICAN AMERICAN: 27
GFR NON-AFRICAN AMERICAN: 23 ML/MIN/1.73
GLUCOSE BLD-MCNC: 134 MG/DL (ref 74–99)
GLUCOSE URINE: 100 MG/DL
HCT VFR BLD CALC: 25.3 % (ref 37–54)
HCT VFR BLD CALC: 26.3 % (ref 37–54)
HEMOGLOBIN: 7.6 G/DL (ref 12.5–16.5)
HYPOCHROMIA: ABNORMAL
IMMATURE GRANULOCYTES #: 0.24 E9/L
IMMATURE GRANULOCYTES %: 3.5 % (ref 0–5)
IMMATURE RETIC FRACT: 27.9 % (ref 2.3–13.4)
INR BLD: 2.5
IRON SATURATION: 22 % (ref 20–55)
IRON: 36 MCG/DL (ref 59–158)
KETONES, URINE: NEGATIVE MG/DL
LACTATE DEHYDROGENASE: 470 U/L (ref 135–225)
LEUKOCYTE ESTERASE, URINE: ABNORMAL
LYMPHOCYTES ABSOLUTE: 1.3 E9/L (ref 1.5–4)
LYMPHOCYTES RELATIVE PERCENT: 19 % (ref 20–42)
MCH RBC QN AUTO: 26 PG (ref 26–35)
MCHC RBC AUTO-ENTMCNC: 28.9 % (ref 32–34.5)
MCV RBC AUTO: 90.1 FL (ref 80–99.9)
METER GLUCOSE: 126 MG/DL (ref 74–99)
METER GLUCOSE: 135 MG/DL (ref 74–99)
METER GLUCOSE: 228 MG/DL (ref 74–99)
MONOCYTES ABSOLUTE: 0.7 E9/L (ref 0.1–0.95)
MONOCYTES RELATIVE PERCENT: 10.2 % (ref 2–12)
NEUTROPHILS ABSOLUTE: 4.35 E9/L (ref 1.8–7.3)
NEUTROPHILS RELATIVE PERCENT: 63.5 % (ref 43–80)
NITRITE, URINE: POSITIVE
OSMOLALITY URINE: 318 MOSM/KG (ref 300–900)
OVALOCYTES: ABNORMAL
PDW BLD-RTO: 17.2 FL (ref 11.5–15)
PH UA: 6 (ref 5–9)
PLATELET # BLD: 184 E9/L (ref 130–450)
PMV BLD AUTO: 11.2 FL (ref 7–12)
POIKILOCYTES: ABNORMAL
POLYCHROMASIA: ABNORMAL
POTASSIUM SERPL-SCNC: 4.8 MMOL/L (ref 3.5–5)
POTASSIUM, UR: 17 MMOL/L
PROTEIN UA: 100 MG/DL
PROTHROMBIN TIME: 28.9 SEC (ref 9.3–12.4)
RBC # BLD: 2.92 E12/L (ref 3.8–5.8)
RBC UA: ABNORMAL /HPF (ref 0–2)
RETIC HGB EQUIVALENT: 30.4 PG (ref 28.2–36.6)
RETICULOCYTE ABSOLUTE COUNT: 0.09 E12/L
RETICULOCYTE COUNT PCT: 3.2 % (ref 0.4–1.9)
SODIUM BLD-SCNC: 135 MMOL/L (ref 132–146)
SODIUM URINE: 45 MMOL/L
SPECIFIC GRAVITY UA: 1.01 (ref 1–1.03)
TOTAL IRON BINDING CAPACITY: 163 MCG/DL (ref 250–450)
UROBILINOGEN, URINE: 0.2 E.U./DL
VITAMIN B-12: 1251 PG/ML (ref 211–946)
WBC # BLD: 6.9 E9/L (ref 4.5–11.5)
WBC UA: ABNORMAL /HPF (ref 0–5)

## 2019-06-09 PROCEDURE — 82607 VITAMIN B-12: CPT

## 2019-06-09 PROCEDURE — 83935 ASSAY OF URINE OSMOLALITY: CPT

## 2019-06-09 PROCEDURE — 82378 CARCINOEMBRYONIC ANTIGEN: CPT

## 2019-06-09 PROCEDURE — 82962 GLUCOSE BLOOD TEST: CPT

## 2019-06-09 PROCEDURE — 85610 PROTHROMBIN TIME: CPT

## 2019-06-09 PROCEDURE — 82105 ALPHA-FETOPROTEIN SERUM: CPT

## 2019-06-09 PROCEDURE — 86301 IMMUNOASSAY TUMOR CA 19-9: CPT

## 2019-06-09 PROCEDURE — 85025 COMPLETE CBC W/AUTO DIFF WBC: CPT

## 2019-06-09 PROCEDURE — 2580000003 HC RX 258: Performed by: INTERNAL MEDICINE

## 2019-06-09 PROCEDURE — 36415 COLL VENOUS BLD VENIPUNCTURE: CPT

## 2019-06-09 PROCEDURE — 84300 ASSAY OF URINE SODIUM: CPT

## 2019-06-09 PROCEDURE — 86316 IMMUNOASSAY TUMOR OTHER: CPT

## 2019-06-09 PROCEDURE — 84133 ASSAY OF URINE POTASSIUM: CPT

## 2019-06-09 PROCEDURE — 2140000000 HC CCU INTERMEDIATE R&B

## 2019-06-09 PROCEDURE — 82436 ASSAY OF URINE CHLORIDE: CPT

## 2019-06-09 PROCEDURE — 80048 BASIC METABOLIC PNL TOTAL CA: CPT

## 2019-06-09 PROCEDURE — 93010 ELECTROCARDIOGRAM REPORT: CPT | Performed by: INTERNAL MEDICINE

## 2019-06-09 PROCEDURE — 82746 ASSAY OF FOLIC ACID SERUM: CPT

## 2019-06-09 PROCEDURE — 81001 URINALYSIS AUTO W/SCOPE: CPT

## 2019-06-09 PROCEDURE — 82570 ASSAY OF URINE CREATININE: CPT

## 2019-06-09 PROCEDURE — 6370000000 HC RX 637 (ALT 250 FOR IP): Performed by: INTERNAL MEDICINE

## 2019-06-09 PROCEDURE — 83550 IRON BINDING TEST: CPT

## 2019-06-09 PROCEDURE — 82728 ASSAY OF FERRITIN: CPT

## 2019-06-09 PROCEDURE — 83615 LACTATE (LD) (LDH) ENZYME: CPT

## 2019-06-09 PROCEDURE — 85045 AUTOMATED RETICULOCYTE COUNT: CPT

## 2019-06-09 PROCEDURE — 2500000003 HC RX 250 WO HCPCS: Performed by: INTERNAL MEDICINE

## 2019-06-09 PROCEDURE — 82542 COL CHROMOTOGRAPHY QUAL/QUAN: CPT

## 2019-06-09 PROCEDURE — 83540 ASSAY OF IRON: CPT

## 2019-06-09 RX ADMIN — AMLODIPINE BESYLATE 5 MG: 5 TABLET ORAL at 08:55

## 2019-06-09 RX ADMIN — CARBIDOPA AND LEVODOPA 1 TABLET: 25; 100 TABLET ORAL at 22:23

## 2019-06-09 RX ADMIN — DIVALPROEX SODIUM 125 MG: 125 TABLET, DELAYED RELEASE ORAL at 08:55

## 2019-06-09 RX ADMIN — INSULIN LISPRO 4 UNITS: 100 INJECTION, SOLUTION INTRAVENOUS; SUBCUTANEOUS at 12:31

## 2019-06-09 RX ADMIN — SODIUM BICARBONATE: 84 INJECTION, SOLUTION INTRAVENOUS at 21:18

## 2019-06-09 RX ADMIN — DIVALPROEX SODIUM 125 MG: 125 TABLET, DELAYED RELEASE ORAL at 22:23

## 2019-06-09 RX ADMIN — INSULIN GLARGINE 10 UNITS: 100 INJECTION, SOLUTION SUBCUTANEOUS at 22:25

## 2019-06-09 RX ADMIN — CARBIDOPA AND LEVODOPA 1 TABLET: 25; 100 TABLET ORAL at 08:55

## 2019-06-09 RX ADMIN — SERTRALINE 100 MG: 100 TABLET, FILM COATED ORAL at 08:55

## 2019-06-09 RX ADMIN — DAPSONE 50 MG: 25 TABLET ORAL at 08:56

## 2019-06-09 RX ADMIN — PREDNISONE 2.5 MG: 5 TABLET ORAL at 08:55

## 2019-06-09 RX ADMIN — DIVALPROEX SODIUM 125 MG: 125 TABLET, DELAYED RELEASE ORAL at 13:46

## 2019-06-09 RX ADMIN — CINACALCET HYDROCHLORIDE 30 MG: 30 TABLET, FILM COATED ORAL at 08:56

## 2019-06-09 RX ADMIN — PRAVASTATIN SODIUM 20 MG: 20 TABLET ORAL at 08:55

## 2019-06-09 RX ADMIN — LEVOTHYROXINE SODIUM 25 MCG: 25 TABLET ORAL at 07:35

## 2019-06-09 RX ADMIN — ROPINIROLE HYDROCHLORIDE 0.25 MG: 0.25 TABLET, FILM COATED ORAL at 22:21

## 2019-06-09 RX ADMIN — CARBIDOPA AND LEVODOPA 1 TABLET: 25; 100 TABLET ORAL at 13:46

## 2019-06-09 RX ADMIN — DAPSONE 25 MG: 25 TABLET ORAL at 22:21

## 2019-06-09 ASSESSMENT — PAIN DESCRIPTION - PAIN TYPE: TYPE: ACUTE PAIN

## 2019-06-09 ASSESSMENT — PAIN SCALES - GENERAL
PAINLEVEL_OUTOF10: 5
PAINLEVEL_OUTOF10: 0
PAINLEVEL_OUTOF10: 0

## 2019-06-09 ASSESSMENT — PAIN DESCRIPTION - LOCATION: LOCATION: ABDOMEN

## 2019-06-09 ASSESSMENT — PAIN DESCRIPTION - FREQUENCY: FREQUENCY: CONTINUOUS

## 2019-06-09 ASSESSMENT — PAIN DESCRIPTION - DESCRIPTORS: DESCRIPTORS: OTHER (COMMENT)

## 2019-06-09 ASSESSMENT — PAIN DESCRIPTION - ORIENTATION: ORIENTATION: RIGHT;LEFT;LOWER;MID;UPPER

## 2019-06-09 NOTE — CONSULTS
Consults      Hematology/Oncology  Consult      Patient Name: Kaylee Tinsley  YOB: 1950  PCP: Bhupendra Valencia DO   Referring Provider:      Reason for Consultation:   Chief Complaint   Patient presents with    Abnormal Lab     sent from [facility] for INR of 9        History of Present Illness:    78-year-old man admitted through emergency room with elevated INR of 9.0 despite withholding his Coumadin at the [facility] facility  He has multiple comorbid conditions and had been on warfarin apparently for DVT of left lower extremity  He had been complaining of fatigue and epigastric discomfort with dark tarry stools  His last EGD and colonoscopy done in April 2018 was relevant for gastritis and severe diverticulosis. He reports 10 pounds weight loss over the past month  CT scan of abdomen and pelvis showed widespread metastatic multifocal deposits on the liver largest in the left hepatic lobe with metastatic intra-abdominal lymphadenopathy in the retroperitoneum as well as mesenteric lymphadenopathy periportal and celiac axis lymphadenopathy  He was given vitamin K in the emergency room. Hemoglobin had dropped from 9.1 on admission to 7.6 this morning. His INR is down to 2.5 today  CEA was normal at 3.6  His CMP showed a creatinine of 2.8 with an estimated GFR of 33 mL/m and his hepatic panel was relevant for elevation in alkaline phosphatase.  His serum lipase was slightly elevated at 100    Diagnostic Data:     Past Medical History:   Diagnosis Date    Acute gastritis without hemorrhage 4/5/2018    Cellulitis     left leg    Chronic kidney disease     stage 3    Diabetes mellitus ([address]     type 2    Diverticulosis of colon 4/5/2018    DVT of leg (deep venous thrombosis) (HCC)     left    Gait abnormality     Hx of blood clots     Hyperlipidemia     Hypertension     Obesity     Polyneuropathy     Restless leg syndrome     Sepsis ([address]     Smoker        Patient Active Problem List Diagnosis Date Noted    GIB (gastrointestinal bleeding) 06/08/2019    Morbid obesity with BMI of 45.0-49.9, adult ([facility] 11/27/2018    CKD (chronic kidney disease) stage 3, GFR 30-59 ml/min ([facility]) 11/27/2018    Bradycardia 11/26/2018    Diabetes mellitus type 2, uncontrolled ([facility] 04/02/2018    Hyperlipidemia LDL goal <100 04/02/2018    Acquired hypothyroidism 04/02/2018    Anemia of chronic disease 04/02/2018    History of DVT (deep vein thrombosis) 02/23/2016        Past Surgical History:   Procedure Laterality Date    KNEE SURGERY      left    [place] COLONOSCOPY FLX DX W/COLLJ SPEC WHEN PFRMD N/A 4/4/2018    COLONOSCOPY DIAGNOSTIC --TIME UNDETERMINED performed by Jennifer Thompson MD at [facility] EGD TRANSORAL BIOPSY SINGLE/MULTIPLE N/A 4/4/2018    EGD BIOPSY performed by [facility] MD at [facility] History  Family History   Family history unknown: Yes       Social History    TOBACCO:   reports that he quit smoking about 4 years ago. His smoking use included cigarettes. He has a 80.00 pack-year smoking history. He has never used smokeless tobacco.  ETOH:   reports that he does not drink alcohol. Home Medications  Prior to Admission medications    Medication Sig Start Date End Date Taking? Authorizing Provider   camphor-menthol JASWINDER GARCIA [facility]) 0.5-0.5 % lotion Apply topically as needed for Itching Apply topically as needed.    Yes Historical Provider, MD   neomycin-polymyxin-dexameth 3.5-72887-7.1 OINT Place 0.5 inches into both eyes 3 times daily   Yes Historical Provider, MD   melatonin 3 MG TABS tablet Take 5 mg by mouth nightly as needed For sleep   Yes Historical Provider, MD   aluminum & magnesium hydroxide-simethicone (MAALOX) 200-200-20 MG/5ML SUSP suspension Take 20 mLs by mouth every 4 hours as needed for Indigestion   Yes Historical Provider, MD   cinacalcet (SENSIPAR) 30 MG tablet Take 30 mg by mouth daily   Yes Historical Provider, MD   aspirin 81 MG tablet Take 81 mg by weight loss      Objective  /61   Pulse 76   Temp 98.4 °F (36.9 °C) (Temporal)   Resp 20   Ht 5' 6\" (1.676 m)   Wt 220 lb 8 oz (100 kg)   SpO2 94%   BMI 35.59 kg/m²     Physical Exam:     General: AAO to person, place, time, in no acute distress  Head and neck : PERRLA, EOMI . Sclera non icteric. Oropharynx : Clear  Neck: no JVD,  no adenopathy,  Heart: Regular rate and regular rhythm, no murmur  Lungs: Clear to auscultation   Extremities: No edema,no cyanosis,   Abdomen: Soft, mild right upper quadrant tenderness, no rebound, no hepatosplenomegaly. Skin:  No rash. Neurologic:Cranial nerves grossly intact. No focal motor or sensory deficits .     Recent Laboratory Data-   Lab Results   Component Value Date    WBC 6.9 2019    HGB 7.6 (L) 2019    HCT 26.3 (L) 2019    MCV 90.1 2019     2019    LYMPHOPCT 19.0 (L) 2019    RBC 2.92 (L) 2019    MCH 26.0 2019    MCHC 28.9 (L) 2019    RDW 17.2 (H) 2019    NEUTOPHILPCT 63.5 2019    MONOPCT 10.2 2019    BASOPCT 0.7 2019    NEUTROABS 4.35 2019    LYMPHSABS 1.30 (L) 2019    MONOSABS 0.70 2019    EOSABS 0.21 2019    BASOSABS 0.05 2019       Lab Results   Component Value Date     2019    K 4.8 2019     2019    CO2 18 (L) 2019    BUN 60 (H) 2019    CREATININE 2.8 (H) 2019    GLUCOSE 134 (H) 2019    CALCIUM 11.4 (H) 2019    PROT 7.9 2019    LABALBU 3.5 2019    BILITOT 0.2 2019    ALKPHOS 268 (H) 2019    AST 16 2019    ALT <5 2019    LABGLOM 23 2019    GFRAA 27 2019       Lab Results   Component Value Date    IRON 43 (L) 10/26/2017    TIBC 122 (L) 10/26/2017    FERRITIN 394 10/26/2017           Radiology-    Ct Abdomen Pelvis Wo Contrast    Result Date: 2019  Patient MRN:  [id number] : 1950 Age: 70 years Gender: Male Order Date:  2019 No conspicuous ascites is identified. Lower lung bases demonstrate no significant findings. There is space are free. Degenerative changes are seen throughout the spine. There is a sclerotic area in the L2 vertebral body this could be suspicious for metastasis. Further evaluation with a nuclear medicine bone scan is initially recommended for a skeletal survey. Endovascular surgery changes are seen both hip joints. 1. Widespread metastatic liver disease with very large size lesion is present throughout the left lobe in particular and also in the right lobe. 2. Additional prominent metastatic adenopathy are seen in the periportal spaces/celiac axis. 3. Also metastatic adenopathy in lesser degree are present in the midline kasey aortic retroperitoneum spaces. 4. Extensive uncomplicated diverticulosis seen in the left-sided colon. No signs for acute diverticulitis. ALERT:  ABNORMAL REPORT. ASSESSMENT/PLAN :  67-year-old man with multiple comorbid conditions including hypertension hyperlipidemia diabetes mellitus chronic kidney disease       1- Multifocal large metastatic deposits on the liver with associated mesenteric periportal celiac and retroperitoneal lymphadenopathy consistent with malignancy  Last EGD in 2018 showed gastritis and last colonoscopy in 2018 showed diverticulosis  Once INR is reversed CT-guided biopsy of liver lesion will be planned for tissue diagnosis  CT chest without contrast will be done  CEA normal  Ca 19-9 pending      2- Anemia multifactorial related to possible GI bleeding with history of gastritis and anticoagulation and contributed to by chronic kidney disease stage IV. To check iron studies cystic, B12 and folate and blood smear review    3- Moderate hypercalcemia  Rule out hypercalcemia of malignancy versus secondary hyperparathyroidism secondary to CK D  To check PTH related peptide                    Crystal Lal M.D., F.A.C.P.   Electronically signed 6/9/2019 at 11:10 AM

## 2019-06-09 NOTE — H&P
[address] [facility]  History and Physical      CHIEF COMPLAINT:  Elevated INR      HISTORY OF PRESENT ILLNESS:      The patient is a 70 y.o. male patient of Dr Adelita Bray who presents with INR. elevated INR of 9.0 that has been ongoing despite withholding his Coumadin and aspirin. Additionally the nursing home reports that he's had a 13 pound weight loss over one month. Patient states he just doesn't feel right in his stomach and is better described as nausea without vomiting and he believes his stools are black. He reports eating and drinking well as well as no urinary symptoms, constipation or diarrhea. Additionally he denies any syncope, chest pain, shortness of breath, back pain, numbness, weakness or associated traumatic incident. No exac or relief        Past Medical History:    Past Medical History:   Diagnosis Date    Acute gastritis without hemorrhage 4/5/2018    Cellulitis     left leg    Chronic kidney disease     stage 3    Diabetes mellitus ([facility]     type 2    Diverticulosis of colon 4/5/2018    DVT of leg (deep venous thrombosis) (HCC)     left    Gait abnormality     Hx of blood clots     Hyperlipidemia     Hypertension     Obesity     Polyneuropathy     Restless leg syndrome     Sepsis ([facility]     Smoker        Past Surgical History:    Past Surgical History:   Procedure Laterality Date    KNEE SURGERY      left    RI COLONOSCOPY FLX DX W/COLLJ SPEC WHEN PFRMD N/A 4/4/2018    COLONOSCOPY DIAGNOSTIC --TIME UNDETERMINED performed by Manuela Blas MD at [address] EGD TRANSORAL BIOPSY SINGLE/MULTIPLE N/A 4/4/2018    EGD BIOPSY performed by Manuela Blas MD at [address] [facility]       Medications Prior to Admission:    Medications Prior to Admission: camphor-menthol (SARNA) 0.5-0.5 % lotion, Apply topically as needed for Itching Apply topically as needed.   neomycin-polymyxin-dexameth [id number] OINT, Place 0.5 inches into both eyes 3 times daily  melatonin 3 MG TABS tablet, Take 5 mg by mouth nightly as needed For sleep  aluminum & magnesium hydroxide-simethicone (MAALOX) 200-200-20 MG/5ML SUSP suspension, Take 20 mLs by mouth every 4 hours as needed for Indigestion  cinacalcet (SENSIPAR) 30 MG tablet, Take 30 mg by mouth daily  aspirin 81 MG tablet, Take 81 mg by mouth daily  carbidopa-levodopa (SINEMET)  MG per tablet, Take 1 tablet by mouth 3 times daily  sertraline (ZOLOFT) 100 MG tablet, Take 100 mg by mouth daily  insulin detemir (LEVEMIR) 100 UNIT/ML injection vial, Inject 10 Units into the skin 2 times daily   rOPINIRole (REQUIP) 0.25 MG tablet, Take 0.25 mg by mouth nightly  divalproex (DEPAKOTE) 125 MG DR tablet, Take 125 mg by mouth 3 times daily  amLODIPine (NORVASC) 5 MG tablet, Take 1 tablet by mouth daily  dapsone 25 MG tablet, Take 25 mg by mouth daily   predniSONE (DELTASONE) 10 MG tablet, Take 2.5 mg by mouth daily   famotidine (PEPCID) 20 MG tablet, Take 1 tablet by mouth 2 times daily  acetaminophen (TYLENOL) 325 MG tablet, Take 650 mg by mouth every 4 hours as needed for Pain or Fever   levothyroxine (SYNTHROID) 25 MCG tablet, Take 25 mcg by mouth every morning   pravastatin (PRAVACHOL) 20 MG tablet, Take 20 mg by mouth nightly   insulin aspart (NOVOLOG) 100 UNIT/ML injection cartridge, Inject into the skin 3 times daily (before meals) Indications: sliding scale 6:30 AM, 11:30 AM, 4:30 PM <60 Follow Hypoglycemia protocol  =0 units  131-180 =4 units   181-240 =8 units   241-300 =10 units   301-350 =12 units  351-400 =16 units   Greater than 400 =20 units and call MD  vitamin D (ERGOCALCIFEROL) 59686 UNITS CAPS capsule, Take 50,000 Units by mouth once a week Mondays  @ 8:30 AM    Allergies:    Patient has no known allergies. Social History:    reports that he quit smoking about 4 years ago. His smoking use included cigarettes. He has a 80.00 pack-year smoking history.  He has never used smokeless tobacco. He reports that he does not drink alcohol or use drugs. Family History:   Family history is unknown by patient. REVIEW OF SYSTEMS:  As above in the HPI, otherwise negative    PHYSICAL EXAM:    Vitals:  /61   Pulse 76   Temp 98.4 °F (36.9 °C) (Temporal)   Resp 20   Ht 5' 6\" (1.676 m)   Wt 220 lb 8 oz (100 kg)   SpO2 94%   BMI 35.59 kg/m²     General:  Awake, alert, oriented X 3. Well developed, well nourished, well groomed. No apparent distress. HEENT:  Normocephalic, atraumatic. Pupils equal, round, reactive to light. No scleral icterus. No conjunctival injection. Normal lips, teeth, and gums. No nasal discharge. Neck:  Supple  Heart:  RRR, no murmurs, gallops, rubs  Lungs:  CTA bilaterally, bilat symmetrical expansion, no wheeze, rales, or rhonchi  Abdomen:   Bowel sounds present, soft, nontender, no masses, no organomegaly, no peritoneal signs  Extremities:  No clubbing, cyanosis, or edema  Skin:  Warm and dry, no open lesions or rash  Neuro:  Cranial nerves 2-12 intact, no focal deficits  Breast: deferred  Rectal: deferred  Genitalia:  deferred    LABS:    CBC with Differential:    Lab Results   Component Value Date    WBC 6.9 06/09/2019    RBC 2.92 06/09/2019    HGB 7.6 06/09/2019    HCT 25.3 06/09/2019     06/09/2019    MCV 90.1 06/09/2019    MCH 26.0 06/09/2019    MCHC 28.9 06/09/2019    RDW 17.2 06/09/2019    SEGSPCT 68 01/23/2012    LYMPHOPCT 19.0 06/09/2019    MONOPCT 10.2 06/09/2019    BASOPCT 0.7 06/09/2019    MONOSABS 0.70 06/09/2019    LYMPHSABS 1.30 06/09/2019    EOSABS 0.21 06/09/2019    BASOSABS 0.05 06/09/2019     CMP:    Lab Results   Component Value Date     06/09/2019    K 4.8 06/09/2019     06/09/2019    CO2 18 06/09/2019    BUN 60 06/09/2019    CREATININE 2.8 06/09/2019    GFRAA 27 06/09/2019    LABGLOM 23 06/09/2019    GLUCOSE 134 06/09/2019    GLUCOSE 173 01/23/2012    PROT 7.9 06/08/2019    LABALBU 3.5 06/08/2019    LABALBU 4.1 01/22/2012    CALCIUM 11.4 06/09/2019    BILITOT 0.2 06/08/2019    ALKPHOS 268 06/08/2019    AST 16 06/08/2019    ALT <5 06/08/2019     Magnesium:    Lab Results   Component Value Date    MG 2.0 11/28/2018     Phosphorus:    Lab Results   Component Value Date    PHOS 2.4 04/05/2018     PT/INR:    Lab Results   Component Value Date    PROTIME 28.9 06/09/2019    PROTIME 18.8 01/22/2012    INR 2.5 06/09/2019     Last 3 Troponin:    Lab Results   Component Value Date    TROPONINI 0.09 06/08/2019    TROPONINI 0.07 11/27/2018    TROPONINI 0.09 11/27/2018     U/A:    Lab Results   Component Value Date    COLORU Yellow 06/09/2019    PROTEINU 100 06/09/2019    PHUR 6.0 06/09/2019    WBCUA 5-10 06/09/2019    RBCUA 1-3 06/09/2019    BACTERIA MANY 06/09/2019    CLARITYU Clear 06/09/2019    SPECGRAV 1.015 06/09/2019    LEUKOCYTESUR SMALL 06/09/2019    UROBILINOGEN 0.2 06/09/2019    BILIRUBINUR Negative 06/09/2019    BLOODU SMALL 06/09/2019    GLUCOSEU 100 06/09/2019    AMORPHOUS FEW 10/21/2017     ABG:    Lab Results   Component Value Date    PH 7.382 04/03/2018    PH 7.372 01/22/2012    PCO2 35.5 04/03/2018    PO2 75.9 04/03/2018    HCO3 20.6 04/03/2018    BE -4.0 04/03/2018    O2SAT 94.6 04/03/2018     HgBA1c:    Lab Results   Component Value Date    LABA1C 5.3 10/22/2017     FLP:    Lab Results   Component Value Date    TRIG 104 06/03/2017    HDL 24 06/03/2017    LDLCALC 39 06/03/2017    LABVLDL 21 06/03/2017     TSH:    Lab Results   Component Value Date    TSH 1.850 11/27/2018       CT ABDOMEN PELVIS WO CONTRAST   Final Result   1. Widespread metastatic liver disease with very large size lesion is   present throughout the left lobe in particular and also in the right   lobe. 2. Additional prominent metastatic adenopathy are seen in the   periportal spaces/celiac axis. 3. Also metastatic adenopathy in lesser degree are present in the   midline kasey aortic retroperitoneum spaces. 4. Extensive uncomplicated diverticulosis seen in the left-sided   colon.  No signs for acute diverticulitis. ALERT:  ABNORMAL REPORT. CT Chest WO Contrast    (Results Pending)       ASSESSMENT:      Active Problems:    History of DVT (deep vein thrombosis)    Diabetes mellitus type 2, uncontrolled (HCC)    Hyperlipidemia LDL goal <100    GIB (gastrointestinal bleeding)    Metastases to the liver (HCC)    Warfarin-induced coagulopathy (HCC)  Resolved Problems:    * No resolved hospital problems.  *      PLAN:    Admit  IV fluids  Reverse INR  Monitor H&H  Transfuse PRN maintain hemoglobin >= 7  IV PPI  Gen surgical consult   onc cs  -Plan for CT-guided biopsy of liver mass  Medications for other co morbidities cont as appropriate w dosage adjustments as necessary  DVT PPx SCD  DC planning           Electronically signed by Jamie Rothman MD on 6/9/2019 at 2:32 PM

## 2019-06-09 NOTE — PROGRESS NOTES
Left message with pt's contact, Mary Ann Jolly, awaiting call back      Verified with Mary Ann Jolly that she is no blood relation with the patient. She is unaware of any family members of pt., his wife passes away and they had no children that she knows of.

## 2019-06-09 NOTE — CONSULTS
GENERAL SURGERY  CONSULT NOTE  6/9/2019    Physician Consulted: Dr. Suzanne Manning  Reason for Consult: Melena  Referring Physician: Dr. Karin Hanley    HPI  Kaylee Tinsley is a 70 y.o. male with hx of DVTs on Coumadin and Aspirin, who presents for evaluation of supra-therapeutic INR from [address], despite holding Coumadin. Presented w INR 8.7, given Vit K by ED. States has been having some abdominal pain, diffuse, RUQ > rest of abdomen and black stools. Hgb 9.1 to 7.6 overnight (baseline 7-8s). Hx of same last year - EGD and Colonoscopy 4/2018 for anemia/melena showed gastritis and severe diverticulosis, no source of bleed. CT showed widespread metastatic disease with diffuse liver mets and lymphadenopathy from unknown primary. Findings not present on last CT Abdomen 2017. No known personal hx of cancer, reports cancer in both father and mother but unsure of kind. +Weight loss 10 lbs over past month. Past Medical History:   Diagnosis Date    Acute gastritis without hemorrhage 4/5/2018    Cellulitis     left leg    Chronic kidney disease     stage 3    Diabetes mellitus ([address]     type 2    Diverticulosis of colon 4/5/2018    DVT of leg (deep venous thrombosis) (HCC)     left    Gait abnormality     Hx of blood clots     Hyperlipidemia     Hypertension     Obesity     Polyneuropathy     Restless leg syndrome     Sepsis ([address]     Smoker        Past Surgical History:   Procedure Laterality Date    KNEE SURGERY      left    SD COLONOSCOPY FLX DX W/COLLJ SPEC WHEN PFRMD N/A 4/4/2018    COLONOSCOPY DIAGNOSTIC --TIME UNDETERMINED performed by Adarsh Faye MD at [facility] EGD TRANSORAL BIOPSY SINGLE/MULTIPLE N/A 4/4/2018    EGD BIOPSY performed by Adarsh Faye MD at [facility]       Medications Prior to Admission    Prior to Admission medications    Medication Sig Start Date End Date Taking?  Authorizing Provider   camphor-menthol AJSWINDER GARCIA [facility]) 0.5-0.5 % lotion Apply topically as UNIT/ML injection cartridge Inject into the skin 3 times daily (before meals) Indications: sliding scale 6:30 AM, 11:30 AM, 4:30 PM  <60 Follow Hypoglycemia protocol   =0 units   131-180 =4 units    181-240 =8 units    241-300 =10 units    301-350 =12 units   351-400 =16 units    Greater than 400 =20 units and call MD   Yes Historical Provider, MD   vitamin D (ERGOCALCIFEROL) 55524 UNITS CAPS capsule Take 50,000 Units by mouth once a week   @ 8:30 AM   Yes Historical Provider, MD       No Known Allergies    Family History   Family history unknown: Yes       Social History     Tobacco Use    Smoking status: Former Smoker     Packs/day: 2.00     Years: 40.00     Pack years: 80.00     Types: Cigarettes     Last attempt to quit: 2015     Years since quittin.2    Smokeless tobacco: Never Used   Substance Use Topics    Alcohol use: No     Alcohol/week: 0.0 oz    Drug use: No         Review of Systems: pertinent ROS listed in HPI, all others negative       PHYSICAL EXAM:    Vitals:    19 1858 19 2023 19 2320 19 0115   BP: (!) 147/93 (!) 153/70  130/70   Pulse: 74 74  71   Resp: 20 23  20   Temp: 98 °F (36.7 °C) 97.3 °F (36.3 °C)  98.9 °F (37.2 °C)   TempSrc:  Temporal  Temporal   SpO2: 96%   94%   Weight:   220 lb 8 oz (100 kg)    Height:  5' 6\" (1.676 m)         GENERAL:  NAD. A&Ox3. HEAD:  Normocephalic. Atraumatic. EYES:   No scleral icterus. PERRL. LUNGS:  No increased work of breathing. CARDIOVASCULAR: RR  ABDOMEN:  Soft, non-distended, mild TTP RUQ > remainder of abdomen. No guarding, rigidity, rebound. RECTAL: No hemorrhoids. FOBT Positive. Brown Stool.       ASSESSMENT/PLAN:  70 y.o. male with GIB, diffuse metastatic disease of liver with lymphadenopathy of unknown primary, supra-therapeutic INR    Continue to reverse INR - did not receive FFP, down to 2.5 this AM  Tumor Markers  Consider IR Liver Biopsy once INR controlled    Plan will be discussed with  Gomez.     Ирина Del Angel DO  Resident, PGY-1  6/9/2019  3:27 AM

## 2019-06-09 NOTE — PROGRESS NOTES
Dr Rian Rodriguez notified Plasma ordered by emergency department, not given  pt is not A&O, has no family or legal guardian so blood permit unable to be obtained legally  No new orders given at this time

## 2019-06-09 NOTE — PROGRESS NOTES
Spoke with Talat Pierre. They verified there is no family or legal guardian and pt is usually A&O and is able to sign for consents.

## 2019-06-09 NOTE — CONSULTS
has a 80.00 pack-year smoking history. He has never used smokeless tobacco. He reports that he does not drink alcohol or use drugs. Allergies:  Patient has no known allergies.     Current Medications:      0.9 % sodium chloride bolus Once   0.9 % sodium chloride infusion Continuous   insulin lispro (HUMALOG) injection vial 0-12 Units TID WC   insulin lispro (HUMALOG) injection vial 0-6 Units Nightly   glucose (GLUTOSE) 40 % oral gel 15 g PRN   dextrose 50 % IV solution PRN   glucagon (rDNA) injection 1 mg PRN   dextrose 5 % solution PRN   acetaminophen (TYLENOL) tablet 650 mg Q4H PRN   levothyroxine (SYNTHROID) tablet 25 mcg QAM   pravastatin (PRAVACHOL) tablet 20 mg Daily   [START ON 6/10/2019] vitamin D (ERGOCALCIFEROL) capsule 50,000 Units Weekly   famotidine (PEPCID) tablet 20 mg Daily   dapsone tablet 25 mg Daily   predniSONE (DELTASONE) tablet 2.5 mg Daily   amLODIPine (NORVASC) tablet 5 mg Daily   sertraline (ZOLOFT) tablet 100 mg Daily   insulin glargine (LANTUS) injection vial 10 Units BID   rOPINIRole (REQUIP) tablet 0.25 mg Nightly   divalproex (DEPAKOTE) DR tablet 125 mg TID   cinacalcet (SENSIPAR) tablet 30 mg Daily   carbidopa-levodopa (SINEMET)  MG per tablet 1 tablet TID   dapsone tablet 50 mg Daily       Review of Systems:   10 point review of system done at this time is negative except for the things mentioned in the HPI    Physical exam:   Constitutional:    Vitals: /61   Pulse 76   Temp 98.4 °F (36.9 °C) (Temporal)   Resp 20   Ht 5' 6\" (1.676 m)   Wt 220 lb 8 oz (100 kg)   SpO2 94%   BMI 35.59 kg/m²      Elderly male, lying in bed, alert awake and oriented ×3, appears in no acute distress   Skin: no rash, turgor wnl  Heent:  eomi, mmm  Neck: no bruits or jvd noted  Cardiovascular:  S1, S2 without m/r/g  Respiratory: CTA B without w/r/r  Abdomen:  +bs, soft, nt, nd  Ext: No  lower extremity edema  Psychiatric: mood and affect appropriate  Musculoskeletal:  Rom, muscular Order Date:  6/8/2019 4:45 PM TECHNIQUE/NUMBER OF IMAGES/COMPARISON/CLINICAL HISTORY: CT abdomen pelvis Axial images were obtained with sagittal and coronal reconstructions Comparison June 2, 2017. Clinical history diffuse abdominal pain. 70-year-old male patient with a history of diverticulosis of the colon diabetes. FINDINGS: The liver is enlarged with lobulated outlines with multiple confluent areas of hypodensity are present large metastatic implant sac, the largest 1's are seen in the left lobe in a continuing measuring several centimeters. Also lesions measure several centimeters seen throughout the right lobe including the caudate lobe. The spleen appears to be with borderline size are. Portal vein measures 14 mm. The gallbladder is normally distended, it appears unremarkable. There is no dilatation of the biliary tree. Most of the biliary tract is distorted in the left elbow The mass effect present. There are also prominent to lymph nodes in the periportal spaces and in the area of the celiac axis. The pancreas demonstrates atrophy. There is fat replacement of the pancreas. The biliary tree and pancreatic ductal systems are not dilated. Adrenals not enlarged. Kidneys have a multifocal areas of scarring difficult to evaluate for any focal mass lesion without IV contrast. There is atrophy of the left kidney and one in the right kidney. Proper evaluation of the kidneys will required multiphase IV contrast. Ultrasound can be an alternative. There are also prominent lymph nodes in the periportal region although with a dominant central fat signs for some of them. Calcified atheroma changes are seen in the abdominal aorta there is no aneurysm formation There is diffuse thickening of the bladder wall. Prostate gland is mild to moderate enlarged. Seminal vesicles are symmetrical size. Extensive diverticulosis seen throughout the left-sided colon but there is no signs for acute diverticulitis.  The small bowel has unremarkable appearance. No conspicuous ascites is identified. Lower lung bases demonstrate no significant findings. There is space are free. Degenerative changes are seen throughout the spine. There is a sclerotic area in the L2 vertebral body this could be suspicious for metastasis. Further evaluation with a nuclear medicine bone scan is initially recommended for a skeletal survey. Endovascular surgery changes are seen both hip joints. 1. Widespread metastatic liver disease with very large size lesion is present throughout the left lobe in particular and also in the right lobe. 2. Additional prominent metastatic adenopathy are seen in the periportal spaces/celiac axis. 3. Also metastatic adenopathy in lesser degree are present in the midline kasey aortic retroperitoneum spaces. 4. Extensive uncomplicated diverticulosis seen in the left-sided colon. No signs for acute diverticulitis. ALERT:  ABNORMAL REPORT. Assessment  1. Acute kidney injury secondary to severe prerenal azotemia with worsening anemia, hypotension and poor by mouth intake  2. Hyperkalemia on admission with metabolic acidosis, secondary to AK I   3. Chronic kidney disease stage III B secondary to hypertensive nephrosclerosis and type II diabetes with a baseline creatinine of 2.0-2.4   4. Non-anion gap metabolic acidosis   5. Anemia secondary to chronic disease and iron deficiency   6. Metastatic liver cancer   7. Supratherapeutic INR   Plan  1. Switch IV fluids to half-normal saline with 75 mEq of sodium bicarbonate at 100 ML and hour for 1 more day  2. Iron studies reviewed, he has evidence of iron deficiency with the T sat of 22%, ferritin is elevated, will not need any IV iron therapy  3. Add Aranesp 40 µg subcutaneously once  4. Monitor renal function and urine output closely  5.  Check urine for random electrolytes, urine creatinine and urea    Thank you Dr. Gordon Tanner for allowing us to participate in care of Hudson Oliver Electronically signed by Gerson Dye MD on 6/9/2019 at 6:14 PM

## 2019-06-10 ENCOUNTER — APPOINTMENT (OUTPATIENT)
Dept: CT IMAGING | Age: 69
DRG: 436 | End: 2019-06-10
Payer: COMMERCIAL

## 2019-06-10 LAB
ANION GAP SERPL CALCULATED.3IONS-SCNC: 11 MMOL/L (ref 7–16)
ANISOCYTOSIS: ABNORMAL
BASOPHILS ABSOLUTE: 0.05 E9/L (ref 0–0.2)
BASOPHILS RELATIVE PERCENT: 0.8 % (ref 0–2)
BLOOD BANK DISPENSE STATUS: NORMAL
BLOOD BANK DISPENSE STATUS: NORMAL
BLOOD BANK PRODUCT CODE: NORMAL
BLOOD BANK PRODUCT CODE: NORMAL
BPU ID: NORMAL
BPU ID: NORMAL
BUN BLDV-MCNC: 47 MG/DL (ref 8–23)
CALCIUM SERPL-MCNC: 10.9 MG/DL (ref 8.6–10.2)
CHLORIDE BLD-SCNC: 107 MMOL/L (ref 98–107)
CO2: 19 MMOL/L (ref 22–29)
CREAT SERPL-MCNC: 2.4 MG/DL (ref 0.7–1.2)
DESCRIPTION BLOOD BANK: NORMAL
DESCRIPTION BLOOD BANK: NORMAL
EOSINOPHILS ABSOLUTE: 0.24 E9/L (ref 0.05–0.5)
EOSINOPHILS RELATIVE PERCENT: 4 % (ref 0–6)
GFR AFRICAN AMERICAN: 33
GFR NON-AFRICAN AMERICAN: 27 ML/MIN/1.73
GLUCOSE BLD-MCNC: 101 MG/DL (ref 74–99)
HCT VFR BLD CALC: 23.9 % (ref 37–54)
HEMOGLOBIN: 6.9 G/DL (ref 12.5–16.5)
IMMATURE GRANULOCYTES #: 0.12 E9/L
IMMATURE GRANULOCYTES %: 2 % (ref 0–5)
INR BLD: 1.3
LYMPHOCYTES ABSOLUTE: 1.33 E9/L (ref 1.5–4)
LYMPHOCYTES RELATIVE PERCENT: 22.3 % (ref 20–42)
MCH RBC QN AUTO: 25.9 PG (ref 26–35)
MCHC RBC AUTO-ENTMCNC: 28.9 % (ref 32–34.5)
MCV RBC AUTO: 89.8 FL (ref 80–99.9)
METER GLUCOSE: 109 MG/DL (ref 74–99)
METER GLUCOSE: 154 MG/DL (ref 74–99)
METER GLUCOSE: 230 MG/DL (ref 74–99)
METER GLUCOSE: 242 MG/DL (ref 74–99)
MONOCYTES ABSOLUTE: 0.58 E9/L (ref 0.1–0.95)
MONOCYTES RELATIVE PERCENT: 9.7 % (ref 2–12)
NEUTROPHILS ABSOLUTE: 3.64 E9/L (ref 1.8–7.3)
NEUTROPHILS RELATIVE PERCENT: 61.2 % (ref 43–80)
OVALOCYTES: ABNORMAL
PDW BLD-RTO: 17.2 FL (ref 11.5–15)
PLATELET # BLD: 160 E9/L (ref 130–450)
PMV BLD AUTO: 10.4 FL (ref 7–12)
POIKILOCYTES: ABNORMAL
POLYCHROMASIA: ABNORMAL
POTASSIUM SERPL-SCNC: 4.8 MMOL/L (ref 3.5–5)
PROTHROMBIN TIME: 14.3 SEC (ref 9.3–12.4)
RBC # BLD: 2.66 E12/L (ref 3.8–5.8)
SODIUM BLD-SCNC: 137 MMOL/L (ref 132–146)
WBC # BLD: 6 E9/L (ref 4.5–11.5)

## 2019-06-10 PROCEDURE — 6370000000 HC RX 637 (ALT 250 FOR IP): Performed by: INTERNAL MEDICINE

## 2019-06-10 PROCEDURE — 80048 BASIC METABOLIC PNL TOTAL CA: CPT

## 2019-06-10 PROCEDURE — 71250 CT THORAX DX C-: CPT

## 2019-06-10 PROCEDURE — 6360000002 HC RX W HCPCS: Performed by: INTERNAL MEDICINE

## 2019-06-10 PROCEDURE — 82962 GLUCOSE BLOOD TEST: CPT

## 2019-06-10 PROCEDURE — 2580000003 HC RX 258: Performed by: INTERNAL MEDICINE

## 2019-06-10 PROCEDURE — 36415 COLL VENOUS BLD VENIPUNCTURE: CPT

## 2019-06-10 PROCEDURE — 2140000000 HC CCU INTERMEDIATE R&B

## 2019-06-10 PROCEDURE — 85025 COMPLETE CBC W/AUTO DIFF WBC: CPT

## 2019-06-10 PROCEDURE — 36430 TRANSFUSION BLD/BLD COMPNT: CPT

## 2019-06-10 PROCEDURE — 2580000003 HC RX 258

## 2019-06-10 PROCEDURE — 86923 COMPATIBILITY TEST ELECTRIC: CPT

## 2019-06-10 PROCEDURE — P9016 RBC LEUKOCYTES REDUCED: HCPCS

## 2019-06-10 PROCEDURE — 85610 PROTHROMBIN TIME: CPT

## 2019-06-10 PROCEDURE — 2500000003 HC RX 250 WO HCPCS: Performed by: INTERNAL MEDICINE

## 2019-06-10 RX ORDER — NEOMYCIN SULFATE, POLYMYXIN B SULFATE, AND DEXAMETHASONE 3.5; 10000; 1 MG/G; [USP'U]/G; MG/G
0.5 OINTMENT OPHTHALMIC 3 TIMES DAILY
Status: DISCONTINUED | OUTPATIENT
Start: 2019-06-10 | End: 2019-06-13 | Stop reason: HOSPADM

## 2019-06-10 RX ORDER — 0.9 % SODIUM CHLORIDE 0.9 %
250 INTRAVENOUS SOLUTION INTRAVENOUS ONCE
Status: COMPLETED | OUTPATIENT
Start: 2019-06-10 | End: 2019-06-11

## 2019-06-10 RX ORDER — SODIUM CHLORIDE 0.9 % (FLUSH) 0.9 %
SYRINGE (ML) INJECTION
Status: COMPLETED
Start: 2019-06-10 | End: 2019-06-10

## 2019-06-10 RX ORDER — PETROLATUM 42 G/100G
OINTMENT TOPICAL 2 TIMES DAILY PRN
Status: DISCONTINUED | OUTPATIENT
Start: 2019-06-10 | End: 2019-06-11

## 2019-06-10 RX ADMIN — DAPSONE 25 MG: 25 TABLET ORAL at 20:23

## 2019-06-10 RX ADMIN — Medication 10 ML: at 10:02

## 2019-06-10 RX ADMIN — ERGOCALCIFEROL 50000 UNITS: 1.25 CAPSULE ORAL at 10:02

## 2019-06-10 RX ADMIN — CARBIDOPA AND LEVODOPA 1 TABLET: 25; 100 TABLET ORAL at 13:58

## 2019-06-10 RX ADMIN — CARBIDOPA AND LEVODOPA 1 TABLET: 25; 100 TABLET ORAL at 20:21

## 2019-06-10 RX ADMIN — CARBIDOPA AND LEVODOPA 1 TABLET: 25; 100 TABLET ORAL at 10:01

## 2019-06-10 RX ADMIN — PREDNISONE 2.5 MG: 5 TABLET ORAL at 10:01

## 2019-06-10 RX ADMIN — INSULIN LISPRO 2 UNITS: 100 INJECTION, SOLUTION INTRAVENOUS; SUBCUTANEOUS at 20:19

## 2019-06-10 RX ADMIN — CINACALCET HYDROCHLORIDE 30 MG: 30 TABLET, FILM COATED ORAL at 10:01

## 2019-06-10 RX ADMIN — INSULIN GLARGINE 10 UNITS: 100 INJECTION, SOLUTION SUBCUTANEOUS at 20:18

## 2019-06-10 RX ADMIN — ROPINIROLE HYDROCHLORIDE 0.25 MG: 0.25 TABLET, FILM COATED ORAL at 20:24

## 2019-06-10 RX ADMIN — NEOMYCIN AND POLYMYXIN B SULFATES AND DEXAMETHASONE 0.5 INCH: 3.5; 10000; 1 OINTMENT OPHTHALMIC at 13:58

## 2019-06-10 RX ADMIN — INSULIN LISPRO 4 UNITS: 100 INJECTION, SOLUTION INTRAVENOUS; SUBCUTANEOUS at 16:33

## 2019-06-10 RX ADMIN — DARBEPOETIN ALFA 40 MCG: 40 INJECTION, SOLUTION INTRAVENOUS; SUBCUTANEOUS at 11:46

## 2019-06-10 RX ADMIN — DAPSONE 50 MG: 25 TABLET ORAL at 10:02

## 2019-06-10 RX ADMIN — SODIUM CHLORIDE 250 ML: 9 INJECTION, SOLUTION INTRAVENOUS at 17:07

## 2019-06-10 RX ADMIN — PRAVASTATIN SODIUM 20 MG: 20 TABLET ORAL at 10:01

## 2019-06-10 RX ADMIN — INSULIN LISPRO 2 UNITS: 100 INJECTION, SOLUTION INTRAVENOUS; SUBCUTANEOUS at 11:46

## 2019-06-10 RX ADMIN — LEVOTHYROXINE SODIUM 25 MCG: 25 TABLET ORAL at 06:19

## 2019-06-10 RX ADMIN — FAMOTIDINE 20 MG: 20 TABLET ORAL at 10:02

## 2019-06-10 RX ADMIN — SERTRALINE 100 MG: 100 TABLET, FILM COATED ORAL at 10:01

## 2019-06-10 RX ADMIN — SODIUM BICARBONATE: 84 INJECTION, SOLUTION INTRAVENOUS at 13:58

## 2019-06-10 RX ADMIN — NEOMYCIN AND POLYMYXIN B SULFATES AND DEXAMETHASONE 0.5 INCH: 3.5; 10000; 1 OINTMENT OPHTHALMIC at 10:02

## 2019-06-10 RX ADMIN — DIVALPROEX SODIUM 125 MG: 125 TABLET, DELAYED RELEASE ORAL at 10:01

## 2019-06-10 RX ADMIN — PETROLATUM: 42 OINTMENT TOPICAL at 10:02

## 2019-06-10 RX ADMIN — INSULIN GLARGINE 10 UNITS: 100 INJECTION, SOLUTION SUBCUTANEOUS at 10:03

## 2019-06-10 RX ADMIN — DIVALPROEX SODIUM 125 MG: 125 TABLET, DELAYED RELEASE ORAL at 13:58

## 2019-06-10 RX ADMIN — AMLODIPINE BESYLATE 5 MG: 5 TABLET ORAL at 10:01

## 2019-06-10 RX ADMIN — DIVALPROEX SODIUM 125 MG: 125 TABLET, DELAYED RELEASE ORAL at 20:24

## 2019-06-10 ASSESSMENT — PAIN SCALES - GENERAL
PAINLEVEL_OUTOF10: 0
PAINLEVEL_OUTOF10: 10
PAINLEVEL_OUTOF10: 0
PAINLEVEL_OUTOF10: 0

## 2019-06-10 ASSESSMENT — PAIN DESCRIPTION - FREQUENCY: FREQUENCY: CONTINUOUS

## 2019-06-10 ASSESSMENT — PAIN DESCRIPTION - LOCATION: LOCATION: ABDOMEN

## 2019-06-10 ASSESSMENT — PAIN DESCRIPTION - PAIN TYPE: TYPE: ACUTE PAIN

## 2019-06-10 ASSESSMENT — PAIN DESCRIPTION - ORIENTATION: ORIENTATION: RIGHT;UPPER

## 2019-06-10 NOTE — PROGRESS NOTES
Consults      Hematology/Oncology  Consult      Patient Name: Antwan Perez  YOB: 1950  PCP: Lito Davidson DO   Referring Provider:      Reason for Consultation:   Chief Complaint   Patient presents with    Abnormal Lab     sent from NH for INR of 9      Subjective:  Remains weak overall but feels slightly better. INR corrected to 1.3      History of Present Illness:    69-year-old man admitted through emergency room with elevated INR of 9.0 despite withholding his Coumadin at the [facility] facility  He has multiple comorbid conditions and had been on warfarin apparently for DVT of left lower extremity  He had been complaining of fatigue and epigastric discomfort with dark tarry stools  His last EGD and colonoscopy done in April 2018 was relevant for gastritis and severe diverticulosis. He reports 10 pounds weight loss over the past month  CT scan of abdomen and pelvis showed widespread metastatic multifocal deposits on the liver largest in the left hepatic lobe with metastatic intra-abdominal lymphadenopathy in the retroperitoneum as well as mesenteric lymphadenopathy periportal and celiac axis lymphadenopathy  He was given vitamin K in the emergency room. Hemoglobin had dropped from 9.1 on admission to 7.6 this morning. His INR is down to 2.5 today  CEA was normal at 3.6  His CMP showed a creatinine of 2.8 with an estimated GFR of 33 mL/m and his hepatic panel was relevant for elevation in alkaline phosphatase.  His serum lipase was slightly elevated at 100    Diagnostic Data:     Past Medical History:   Diagnosis Date    Acute gastritis without hemorrhage 4/5/2018    Cellulitis     left leg    Chronic kidney disease     stage 3    Diabetes mellitus ([address]     type 2    Diverticulosis of colon 4/5/2018    DVT of leg (deep venous thrombosis) (HCC)     left    Gait abnormality     Hx of blood clots     Hyperlipidemia     Hypertension     Metastases to the liver ([address] 6/9/2019    Date    IRON 36 (L) 2019    TIBC 163 (L) 2019    FERRITIN 1,086 2019           Radiology-    Ct Abdomen Pelvis Wo Contrast    Result Date: 2019  Patient MRN:  [id number] : 1950 Age: 70 years Gender: Male Order Date:  2019 4:45 PM TECHNIQUE/NUMBER OF IMAGES/COMPARISON/CLINICAL HISTORY: CT abdomen pelvis Axial images were obtained with sagittal and coronal reconstructions Comparison 2017. Clinical history diffuse abdominal pain. 50-year-old male patient with a history of diverticulosis of the colon diabetes. FINDINGS: The liver is enlarged with lobulated outlines with multiple confluent areas of hypodensity are present large metastatic implant sac, the largest 1's are seen in the left lobe in a continuing measuring several centimeters. Also lesions measure several centimeters seen throughout the right lobe including the caudate lobe. The spleen appears to be with borderline size are. Portal vein measures 14 mm. The gallbladder is normally distended, it appears unremarkable. There is no dilatation of the biliary tree. Most of the biliary tract is distorted in the left elbow The mass effect present. There are also prominent to lymph nodes in the periportal spaces and in the area of the celiac axis. The pancreas demonstrates atrophy. There is fat replacement of the pancreas. The biliary tree and pancreatic ductal systems are not dilated. Adrenals not enlarged. Kidneys have a multifocal areas of scarring difficult to evaluate for any focal mass lesion without IV contrast. There is atrophy of the left kidney and one in the right kidney. Proper evaluation of the kidneys will required multiphase IV contrast. Ultrasound can be an alternative. There are also prominent lymph nodes in the periportal region although with a dominant central fat signs for some of them.  Calcified atheroma changes are seen in the abdominal aorta there is no aneurysm formation There is diffuse thickening of the bladder wall. Prostate gland is mild to moderate enlarged. Seminal vesicles are symmetrical size. Extensive diverticulosis seen throughout the left-sided colon but there is no signs for acute diverticulitis. The small bowel has unremarkable appearance. No conspicuous ascites is identified. Lower lung bases demonstrate no significant findings. There is space are free. Degenerative changes are seen throughout the spine. There is a sclerotic area in the L2 vertebral body this could be suspicious for metastasis. Further evaluation with a nuclear medicine bone scan is initially recommended for a skeletal survey. Endovascular surgery changes are seen both hip joints. 1. Widespread metastatic liver disease with very large size lesion is present throughout the left lobe in particular and also in the right lobe. 2. Additional prominent metastatic adenopathy are seen in the periportal spaces/celiac axis. 3. Also metastatic adenopathy in lesser degree are present in the midline kasey aortic retroperitoneum spaces. 4. Extensive uncomplicated diverticulosis seen in the left-sided colon. No signs for acute diverticulitis. ALERT:  ABNORMAL REPORT. ASSESSMENT/PLAN :  58-year-old man with multiple comorbid conditions including hypertension hyperlipidemia diabetes mellitus chronic kidney disease       1- Multifocal large metastatic deposits on the liver with associated mesenteric periportal celiac and retroperitoneal lymphadenopathy consistent with malignancy  Last EGD in 2018 showed gastritis and last colonoscopy in 2018 showed diverticulosis  Once INR is reversed CT-guided biopsy of liver lesion will be planned for tissue diagnosis  CT chest without contrast will be done  CEA normal  Ca 19-9 pending      2- Anemia multifactorial related to possible GI bleeding with history of gastritis and anticoagulation and contributed to by chronic kidney disease stage IV.   To check iron studies cystic, B12 and folate and blood smear review    3- Moderate hypercalcemia  Rule out hypercalcemia of malignancy versus secondary hyperparathyroidism secondary to CK D  To check PTH related peptide      LDH elevated at 470  Iron studies consistent with anemia of chronic disease  INR corrected to 1.3  PTH related peptide pending  CT chest pending  Awaiting CT-guided liver biopsy    Talya Albright. Hillary Smith M.D., F.A.C.P.   Electronically signed 6/10/2019 at 5:53 PM

## 2019-06-10 NOTE — PROGRESS NOTES
Nephrology Consult Note    Patient's Name: Larissa Cuevas  2:11 PM  6/10/2019    Nephrologist: Merrill Kelly M.D.    Reason for Consult:  Renal is consulted for acute kidney injury on chronic kidney disease stage III  Requesting Physician:  Enrique Hatch DO    Subjective: Patient seen and examined bedside, he is sleeping, no acute overnight events  Past Medical History:   Diagnosis Date    Acute gastritis without hemorrhage 4/5/2018    Cellulitis     left leg    Chronic kidney disease     stage 3    Diabetes mellitus ([address]     type 2    Diverticulosis of colon 4/5/2018    DVT of leg (deep venous thrombosis) ([address]     left    Gait abnormality     Hx of blood clots     Hyperlipidemia     Hypertension     Metastases to the [address] ([facility] 6/9/2019    Obesity     Polyneuropathy     Restless leg syndrome     Sepsis ([facility]     Smoker        Past Surgical History:   Procedure Laterality Date    KNEE SURGERY      left    LA COLONOSCOPY FLX DX W/COLLJ SPEC WHEN PFRMD N/A 4/4/2018    COLONOSCOPY DIAGNOSTIC --TIME UNDETERMINED performed by Viktoria Colon MD at [address] [place] [address] EGD TRANSORAL BIOPSY SINGLE/MULTIPLE N/A 4/4/2018    EGD BIOPSY performed by Viktoria Colon MD at [place] [address] History   Family history unknown: Yes        reports that he quit smoking about 4 years ago. His smoking use included cigarettes. He has a 80.00 pack-year smoking history. He has never used smokeless tobacco. He reports that he does not drink alcohol or use drugs. Allergies:  Patient has no known allergies.     Current Medications:      mineral oil-hydrophilic petrolatum (HYDROPHOR) ointment BID PRN   neomycin-polymyxin-dexameth ophthalmic ointment 0.5 inch TID   camphor-menthol (SARNA) lotion PRN   0.9 % sodium chloride bolus Once   pneumococcal 13-valent conjugate (PREVNAR) injection 0.5 mL Prior to discharge   sodium bicarbonate 75 mEq in sodium chloride 0.45 % 1,000 mL infusion Continuous   0.9 % sodium chloride bolus Once   insulin lispro (HUMALOG) injection vial 0-12 Units TID WC   insulin lispro (HUMALOG) injection vial 0-6 Units Nightly   glucose (GLUTOSE) 40 % oral gel 15 g PRN   dextrose 50 % IV solution PRN   glucagon (rDNA) injection 1 mg PRN   dextrose 5 % solution PRN   acetaminophen (TYLENOL) tablet 650 mg Q4H PRN   levothyroxine (SYNTHROID) tablet 25 mcg QAM   pravastatin (PRAVACHOL) tablet 20 mg Daily   vitamin D (ERGOCALCIFEROL) capsule 50,000 Units Weekly   famotidine (PEPCID) tablet 20 mg Daily   dapsone tablet 25 mg Daily   predniSONE (DELTASONE) tablet 2.5 mg Daily   amLODIPine (NORVASC) tablet 5 mg Daily   sertraline (ZOLOFT) tablet 100 mg Daily   insulin glargine (LANTUS) injection vial 10 Units BID   rOPINIRole (REQUIP) tablet 0.25 mg Nightly   divalproex (DEPAKOTE) DR tablet 125 mg TID   cinacalcet (SENSIPAR) tablet 30 mg Daily   carbidopa-levodopa (SINEMET)  MG per tablet 1 tablet TID   dapsone tablet 50 mg Daily       Review of Systems:   10 point review of system done at this time is negative except for the things mentioned in the HPI    Physical exam:   Constitutional:    Vitals: /66   Pulse 72   Temp 98.4 °F (36.9 °C) (Oral)   Resp 21   Ht 5' 6\" (1.676 m)   Wt 219 lb 6.4 oz (99.5 kg)   SpO2 97%   BMI 35.41 kg/m²      Elderly male, lying in bed, alert awake and oriented ×3, appears in no acute distress   Skin: no rash, turgor wnl  Heent:  eomi, mmm  Neck: no bruits or jvd noted  Cardiovascular:  S1, S2 without m/r/g  Respiratory: CTA B without w/r/r  Abdomen:  +bs, soft, nt, nd  Ext: No  lower extremity edema  Psychiatric: mood and affect appropriate  Musculoskeletal:  Rom, muscular strength intact    Data:   Labs:  CBC:   Lab Results   Component Value Date    WBC 6.0 06/10/2019    RBC 2.66 06/10/2019    HGB 6.9 06/10/2019    HCT 23.9 06/10/2019    MCV 89.8 06/10/2019    MCH 25.9 06/10/2019    MCHC 28.9 06/10/2019    RDW 17.2 06/10/2019    PLT 160 06/10/2019    MPV 10.4 06/10/2019     CMP:    Lab Results   Component Value Date     06/10/2019    K 4.8 06/10/2019     06/10/2019    CO2 19 06/10/2019    BUN 47 06/10/2019    CREATININE 2.4 06/10/2019    GFRAA 33 06/10/2019    LABGLOM 27 06/10/2019    GLUCOSE 101 06/10/2019    GLUCOSE 173 2012    PROT 7.9 2019    LABALBU 3.5 2019    LABALBU 4.1 2012    CALCIUM 10.9 06/10/2019    BILITOT 0.2 2019    ALKPHOS 268 2019    AST 16 2019    ALT <5 2019     BMP:    Lab Results   Component Value Date     06/10/2019    K 4.8 06/10/2019     06/10/2019    CO2 19 06/10/2019    BUN 47 06/10/2019    LABALBU 3.5 2019    LABALBU 4.1 2012    CREATININE 2.4 06/10/2019    CALCIUM 10.9 06/10/2019    GFRAA 33 06/10/2019    LABGLOM 27 06/10/2019    GLUCOSE 101 06/10/2019    GLUCOSE 173 2012     Calcium:    Lab Results   Component Value Date    CALCIUM 10.9 06/10/2019     Phosphorus:    Lab Results   Component Value Date    PHOS 2.4 2018     Uric Acid:  No results found for: URICACID  U/A:    Lab Results   Component Value Date    NITRU POSITIVE 2019    COLORU Yellow 2019    PHUR 6.0 2019    WBCUA 5-10 2019    RBCUA 1-3 2019    BACTERIA MANY 2019    CLARITYU Clear 2019    SPECGRAV 1.015 2019    LEUKOCYTESUR SMALL 2019    UROBILINOGEN 0.2 2019    BILIRUBINUR Negative 2019    BLOODU SMALL 2019    GLUCOSEU 100 2019    KETUA Negative 2019    AMORPHOUS FEW 10/21/2017        Imaging:  Ct Abdomen Pelvis Wo Contrast    Result Date: 2019  Patient MRN:  [id number] : 1950 Age: 70 years Gender: Male Order Date:  2019 4:45 PM TECHNIQUE/NUMBER OF IMAGES/COMPARISON/CLINICAL HISTORY: CT abdomen pelvis Axial images were obtained with sagittal and coronal reconstructions Comparison 2017. Clinical history diffuse abdominal pain.  39-year-old male patient with a history of diverticulosis of the colon diabetes. FINDINGS: The liver is enlarged with lobulated outlines with multiple confluent areas of hypodensity are present large metastatic implant sac, the largest 1's are seen in the left lobe in a continuing measuring several centimeters. Also lesions measure several centimeters seen throughout the right lobe including the caudate lobe. The spleen appears to be with borderline size are. Portal vein measures 14 mm. The gallbladder is normally distended, it appears unremarkable. There is no dilatation of the biliary tree. Most of the biliary tract is distorted in the left elbow The mass effect present. There are also prominent to lymph nodes in the periportal spaces and in the area of the celiac axis. The pancreas demonstrates atrophy. There is fat replacement of the pancreas. The biliary tree and pancreatic ductal systems are not dilated. Adrenals not enlarged. Kidneys have a multifocal areas of scarring difficult to evaluate for any focal mass lesion without IV contrast. There is atrophy of the left kidney and one in the right kidney. Proper evaluation of the kidneys will required multiphase IV contrast. Ultrasound can be an alternative. There are also prominent lymph nodes in the periportal region although with a dominant central fat signs for some of them. Calcified atheroma changes are seen in the abdominal aorta there is no aneurysm formation There is diffuse thickening of the bladder wall. Prostate gland is mild to moderate enlarged. Seminal vesicles are symmetrical size. Extensive diverticulosis seen throughout the left-sided colon but there is no signs for acute diverticulitis. The small bowel has unremarkable appearance. No conspicuous ascites is identified. Lower lung bases demonstrate no significant findings. There is space are free. Degenerative changes are seen throughout the spine.  There is a sclerotic area in the L2 vertebral body this could be suspicious for metastasis. Further evaluation with a nuclear medicine bone scan is initially recommended for a skeletal survey. Endovascular surgery changes are seen both hip joints. 1. Widespread metastatic liver disease with very large size lesion is present throughout the left lobe in particular and also in the right lobe. 2. Additional prominent metastatic adenopathy are seen in the periportal spaces/celiac axis. 3. Also metastatic adenopathy in lesser degree are present in the midline kasey aortic retroperitoneum spaces. 4. Extensive uncomplicated diverticulosis seen in the left-sided colon. No signs for acute diverticulitis. ALERT:  ABNORMAL REPORT. Results for Bar Miles (MRN [id number]) as of 6/10/2019 14:12   Ref. Range 6/9/2019 05:50 6/9/2019 18:50   Chloride Latest Ref Range: Not Established mmol/L  40   Creatinine, Ur Latest Ref Range: 40 - 278 mg/dL  48   Osmolality, Ur Latest Ref Range: 300 - 900 mOsm/kg  318   Potassium, Ur Latest Ref Range: Not Established mmol/L  17.0   Sodium, Ur Latest Ref Range: Not Established mmol/L  45       Assessment  1. Acute kidney injury secondary to severe prerenal azotemia with worsening anemia, hypotension and poor by mouth intake  2. Hyperkalemia on admission with metabolic acidosis, secondary to AK I   3. Chronic kidney disease stage III B secondary to hypertensive nephrosclerosis and type II diabetes with a baseline creatinine of 2.0-2.4   4. Non-anion gap metabolic acidosis   5. Anemia secondary to chronic disease and iron deficiency   6. Metastatic liver cancer   7. Supratherapeutic INR   Plan  1. Urine electrolytes reviewed, calculated fractional excretion of sodium is 1.7%, creatinine has improved with IV fluids, decreased bicarbonate drip to 50 ML/hr, okay to stop after 1 L   3. Add Aranesp 40 µg subcutaneously once  4. Monitor renal function and urine output closely  5.  PRBC transfusion      Thank you Dr. Steffan Eisenmenger for allowing us to participate in care of Kaylee Tinsley         Electronically signed by Tasha Pimentel MD on 6/10/2019 at 2:11 PM

## 2019-06-10 NOTE — CARE COORDINATION
Care Coordination:  Pt is from [facility]. Per Liaison, pt is bed hold an can return when stable. Per nursing rounds. Pt is alert/oriented- oriented to self and place. Has a daughter mark. Pt states he does want to return to [facility] upon discharge. Nursing to reach out to daughter for treatment consent and will confirm this plan as well.     Ryan Aus

## 2019-06-10 NOTE — PROGRESS NOTES
Subjective:  Feeling better No CP, SOB, F, V, D, P     Objective:    BP (!) 117/58   Pulse 71   Temp 98 °F (36.7 °C) (Temporal)   Resp 18   Ht 5' 6\" (1.676 m)   Wt 219 lb 6.4 oz (99.5 kg)   SpO2 95%   BMI 35.41 kg/m²     24HR INTAKE/OUTPUT:      Intake/Output Summary (Last 24 hours) at 6/10/2019 0834  Last data filed at 6/10/2019 0645  Gross per 24 hour   Intake 1650 ml   Output 1675 ml   Net -25 ml     nad confused  Heart:  RRR, no murmurs, gallops, or rubs. Lungs:  CTA bilaterally, no wheeze, rales or rhonchi  Abd: bowel sounds present, nontender, nondistended, no masses  Extrem:  No clubbing, cyanosis, or edema    Most Recent Labs  Lab Results   Component Value Date    WBC 6.0 06/10/2019    HGB 6.9 (L) 06/10/2019    HCT 23.9 (L) 06/10/2019     06/10/2019     06/09/2019    K 4.8 06/09/2019     06/09/2019    CREATININE 2.8 (H) 06/09/2019    BUN 60 (H) 06/09/2019    CO2 18 (L) 06/09/2019    GLUCOSE 134 (H) 06/09/2019    ALT <5 06/08/2019    AST 16 06/08/2019    INR 1.3 06/10/2019    TSH 1.850 11/27/2018    LABA1C 5.3 10/22/2017    LABMICR 249.7 (H) 04/02/2018     No results for input(s): MG in the last 72 hours. Lab Results   Component Value Date    CALCIUM 11.4 (H) 06/09/2019    PHOS 2.4 (L) 04/05/2018        CT ABDOMEN PELVIS WO CONTRAST   Final Result   1. Widespread metastatic liver disease with very large size lesion is   present throughout the left lobe in particular and also in the right   lobe. 2. Additional prominent metastatic adenopathy are seen in the   periportal spaces/celiac axis. 3. Also metastatic adenopathy in lesser degree are present in the   midline kasey aortic retroperitoneum spaces. 4. Extensive uncomplicated diverticulosis seen in the left-sided   colon. No signs for acute diverticulitis. ALERT:  ABNORMAL REPORT.             CT Chest WO Contrast    (Results Pending)       Assessment    Active Problems:    History of DVT (deep vein thrombosis) JANETH (acute kidney injury) ([facility]    Diabetes mellitus type 2, uncontrolled ([facility]    Hyperlipidemia LDL goal <100    CKD (chronic kidney disease) stage 3, GFR 30-59 ml/min (HCC)    GIB (gastrointestinal bleeding)    Metastases to the liver (HCC)    Warfarin-induced coagulopathy ([facility]  Resolved Problems:    * No resolved hospital problems.  *        PLAN:    Admit  IV fluids  Reverse INR  Monitor H&H  Transfuse PRN maintain hemoglobin >= 7  IV PPI  Gen surgical consult   onc cs  -Plan for CT-guided biopsy of liver mass  Medications for other co morbidities cont as appropriate w dosage adjustments as necessary  DVT PPx SCD  DC planning       Electronically signed by Lars Bearden MD on 6/10/2019 at 8:31 AM

## 2019-06-11 PROBLEM — E44.0 MODERATE PROTEIN-CALORIE MALNUTRITION (HCC): Chronic | Status: ACTIVE | Noted: 2019-06-11

## 2019-06-11 PROBLEM — E83.52 HYPERCALCEMIA: Status: ACTIVE | Noted: 2019-06-11

## 2019-06-11 LAB
AFP-TUMOR MARKER: 4 NG/ML (ref 0–9)
ANION GAP SERPL CALCULATED.3IONS-SCNC: 10 MMOL/L (ref 7–16)
BASOPHILS ABSOLUTE: 0.04 E9/L (ref 0–0.2)
BASOPHILS RELATIVE PERCENT: 0.6 % (ref 0–2)
BUN BLDV-MCNC: 36 MG/DL (ref 8–23)
CA 19-9: 23 U/ML (ref 0–37)
CALCIUM SERPL-MCNC: 11 MG/DL (ref 8.6–10.2)
CHLORIDE BLD-SCNC: 106 MMOL/L (ref 98–107)
CO2: 21 MMOL/L (ref 22–29)
CREAT SERPL-MCNC: 2 MG/DL (ref 0.7–1.2)
EOSINOPHILS ABSOLUTE: 0.28 E9/L (ref 0.05–0.5)
EOSINOPHILS RELATIVE PERCENT: 3.9 % (ref 0–6)
GFR AFRICAN AMERICAN: 40
GFR NON-AFRICAN AMERICAN: 33 ML/MIN/1.73
GLUCOSE BLD-MCNC: 128 MG/DL (ref 74–99)
HCT VFR BLD CALC: 27.8 % (ref 37–54)
HEMOGLOBIN: 8.5 G/DL (ref 12.5–16.5)
IMMATURE GRANULOCYTES #: 0.18 E9/L
IMMATURE GRANULOCYTES %: 2.5 % (ref 0–5)
INR BLD: 1.2
LYMPHOCYTES ABSOLUTE: 1.32 E9/L (ref 1.5–4)
LYMPHOCYTES RELATIVE PERCENT: 18.4 % (ref 20–42)
MCH RBC QN AUTO: 27 PG (ref 26–35)
MCHC RBC AUTO-ENTMCNC: 30.6 % (ref 32–34.5)
MCV RBC AUTO: 88.3 FL (ref 80–99.9)
METER GLUCOSE: 128 MG/DL (ref 74–99)
METER GLUCOSE: 175 MG/DL (ref 74–99)
METER GLUCOSE: 180 MG/DL (ref 74–99)
METER GLUCOSE: 244 MG/DL (ref 74–99)
MONOCYTES ABSOLUTE: 0.68 E9/L (ref 0.1–0.95)
MONOCYTES RELATIVE PERCENT: 9.5 % (ref 2–12)
NEUTROPHILS ABSOLUTE: 4.69 E9/L (ref 1.8–7.3)
NEUTROPHILS RELATIVE PERCENT: 65.1 % (ref 43–80)
PDW BLD-RTO: 16.6 FL (ref 11.5–15)
PLATELET # BLD: 166 E9/L (ref 130–450)
PMV BLD AUTO: 11 FL (ref 7–12)
POTASSIUM SERPL-SCNC: 4.5 MMOL/L (ref 3.5–5)
PROTHROMBIN TIME: 13.3 SEC (ref 9.3–12.4)
RBC # BLD: 3.15 E12/L (ref 3.8–5.8)
SODIUM BLD-SCNC: 137 MMOL/L (ref 132–146)
WBC # BLD: 7.2 E9/L (ref 4.5–11.5)

## 2019-06-11 PROCEDURE — 6370000000 HC RX 637 (ALT 250 FOR IP): Performed by: INTERNAL MEDICINE

## 2019-06-11 PROCEDURE — 97161 PT EVAL LOW COMPLEX 20 MIN: CPT

## 2019-06-11 PROCEDURE — 85610 PROTHROMBIN TIME: CPT

## 2019-06-11 PROCEDURE — 97166 OT EVAL MOD COMPLEX 45 MIN: CPT

## 2019-06-11 PROCEDURE — 36415 COLL VENOUS BLD VENIPUNCTURE: CPT

## 2019-06-11 PROCEDURE — 80048 BASIC METABOLIC PNL TOTAL CA: CPT

## 2019-06-11 PROCEDURE — 85025 COMPLETE CBC W/AUTO DIFF WBC: CPT

## 2019-06-11 PROCEDURE — 97535 SELF CARE MNGMENT TRAINING: CPT

## 2019-06-11 PROCEDURE — 1200000000 HC SEMI PRIVATE

## 2019-06-11 PROCEDURE — 82962 GLUCOSE BLOOD TEST: CPT

## 2019-06-11 RX ORDER — PETROLATUM 42 G/100G
OINTMENT TOPICAL 3 TIMES DAILY
Status: DISCONTINUED | OUTPATIENT
Start: 2019-06-11 | End: 2019-06-13 | Stop reason: HOSPADM

## 2019-06-11 RX ADMIN — CARBIDOPA AND LEVODOPA 1 TABLET: 25; 100 TABLET ORAL at 21:04

## 2019-06-11 RX ADMIN — DAPSONE 25 MG: 25 TABLET ORAL at 21:04

## 2019-06-11 RX ADMIN — FAMOTIDINE 20 MG: 20 TABLET ORAL at 10:12

## 2019-06-11 RX ADMIN — NEOMYCIN AND POLYMYXIN B SULFATES AND DEXAMETHASONE 0.5 INCH: 3.5; 10000; 1 OINTMENT OPHTHALMIC at 13:13

## 2019-06-11 RX ADMIN — NEOMYCIN AND POLYMYXIN B SULFATES AND DEXAMETHASONE 0.5 INCH: 3.5; 10000; 1 OINTMENT OPHTHALMIC at 01:05

## 2019-06-11 RX ADMIN — PETROLATUM: 42 OINTMENT TOPICAL at 21:04

## 2019-06-11 RX ADMIN — MUPIROCIN: 20 OINTMENT TOPICAL at 15:03

## 2019-06-11 RX ADMIN — DAPSONE 50 MG: 25 TABLET ORAL at 10:12

## 2019-06-11 RX ADMIN — CINACALCET HYDROCHLORIDE 30 MG: 30 TABLET, FILM COATED ORAL at 10:12

## 2019-06-11 RX ADMIN — PRAVASTATIN SODIUM 20 MG: 20 TABLET ORAL at 10:12

## 2019-06-11 RX ADMIN — INSULIN GLARGINE 10 UNITS: 100 INJECTION, SOLUTION SUBCUTANEOUS at 21:05

## 2019-06-11 RX ADMIN — DIVALPROEX SODIUM 125 MG: 125 TABLET, DELAYED RELEASE ORAL at 21:03

## 2019-06-11 RX ADMIN — INSULIN LISPRO 2 UNITS: 100 INJECTION, SOLUTION INTRAVENOUS; SUBCUTANEOUS at 16:55

## 2019-06-11 RX ADMIN — INSULIN GLARGINE 10 UNITS: 100 INJECTION, SOLUTION SUBCUTANEOUS at 09:00

## 2019-06-11 RX ADMIN — DIVALPROEX SODIUM 125 MG: 125 TABLET, DELAYED RELEASE ORAL at 10:12

## 2019-06-11 RX ADMIN — CARBIDOPA AND LEVODOPA 1 TABLET: 25; 100 TABLET ORAL at 13:10

## 2019-06-11 RX ADMIN — CARBIDOPA AND LEVODOPA 1 TABLET: 25; 100 TABLET ORAL at 10:12

## 2019-06-11 RX ADMIN — Medication: at 15:03

## 2019-06-11 RX ADMIN — NEOMYCIN AND POLYMYXIN B SULFATES AND DEXAMETHASONE 0.5 INCH: 3.5; 10000; 1 OINTMENT OPHTHALMIC at 21:04

## 2019-06-11 RX ADMIN — SERTRALINE 100 MG: 100 TABLET, FILM COATED ORAL at 10:12

## 2019-06-11 RX ADMIN — LEVOTHYROXINE SODIUM 25 MCG: 25 TABLET ORAL at 07:12

## 2019-06-11 RX ADMIN — AMLODIPINE BESYLATE 5 MG: 5 TABLET ORAL at 10:12

## 2019-06-11 RX ADMIN — INSULIN LISPRO 2 UNITS: 100 INJECTION, SOLUTION INTRAVENOUS; SUBCUTANEOUS at 21:05

## 2019-06-11 RX ADMIN — DIVALPROEX SODIUM 125 MG: 125 TABLET, DELAYED RELEASE ORAL at 13:10

## 2019-06-11 RX ADMIN — INSULIN LISPRO 2 UNITS: 100 INJECTION, SOLUTION INTRAVENOUS; SUBCUTANEOUS at 11:24

## 2019-06-11 RX ADMIN — PREDNISONE 2.5 MG: 5 TABLET ORAL at 10:12

## 2019-06-11 RX ADMIN — NEOMYCIN AND POLYMYXIN B SULFATES AND DEXAMETHASONE 0.5 INCH: 3.5; 10000; 1 OINTMENT OPHTHALMIC at 09:00

## 2019-06-11 RX ADMIN — ROPINIROLE HYDROCHLORIDE 0.25 MG: 0.25 TABLET, FILM COATED ORAL at 21:04

## 2019-06-11 RX ADMIN — Medication: at 21:05

## 2019-06-11 ASSESSMENT — PAIN SCALES - GENERAL
PAINLEVEL_OUTOF10: 0

## 2019-06-11 NOTE — PROGRESS NOTES
Occupational Therapy  OCCUPATIONAL THERAPY INITIAL EVALUATION      Date:2019  Patient Name: Vicente Spears  MRN: [id number]  : 1950  Room: [address] [place], [place] [address]    Evaluating OT: Monique Chavez OTR/L    AM-PAC Daily Activity Raw Score:   Recommended Adaptive Equipment: Continue to assess    Comments: Based on patient's functional performance as stated above and level of assistance needed prior to admission, this therapist believes that the patient would benefit from continued skilled OT during/following hospital stay in an effort to increase safety, functional independence, and quality of life. Reason for admission: Abdominal pain, fatigue, INR 9.0  Diagnosis: GIB  Procedure: None   Pertinent Medical History: acute gastritis with hemorrhage, LLE cellulitis, CKD stage 3, DM type 2, colon diverticulitis, DNT LLE, h/o blood clots, HLD, HTN, liver mets, obesity, polyneuropathy, sepsis    Precautions:  Falls, dep at baseline for functional mobility     Home Living: Pt has been living at [facility] for years. Bathroom setup: Pt reports completion of toileting tasks at bed level using bed pan.; handicap accessible bathroom   Equipment owned: power wheelchair, hilda lift    Prior Level of Function: Assist with ADLs- able to feed self/grooming with min A; DEP with IADLs; using hilda for transfers; wheelchair for mobility. Pt reports he normally stays in bed throughout the day; occasionally goes to dining room for meals. Driving: N/A  Occupation: Used to work at [employer]    Pain Level: None reported  Cognition: A&O: ~2/4 to self and place. Pt oriented to date. Follows 1 step directions with VC and delay. Memory: F   Sequencing: F   Problem solving: F   Judgement/safety: F        Functional Assessment:   Initial Eval Status  Date: 19 Treatment Status  Date: Short Term Goals  Treatment frequency: 1-3x/week on ICU; PRN on stepdown unit  -pt will improve. ..    Feeding S; setup  simulation      Mod I  Sitting upright in chair for majority of meals; pending cleared diet restriction   Grooming Min A  For long sitting balance upright bed level chair position (increase lean to L) during oral hygiene using mouthwash and applying deodorant with F [facility] skills/ strength     Mod I   while in bed / wheelchair level ; F+/G BUE functional use; G initiation, sequencing and follow-through     UB Dressing Mod A  Simulation bed level upright chair position     SBA  while in bed / wheelchair level ; F+/G BUE functional use; G initiation, sequencing and follow-through   LB Dressing DEP       Bathing UB-  Min A  LB-  DEP    UB-  SBA bed level     Toileting DEP       Bed Mobility  Supine to sit: NT  Sit to supine: NT  Rolling: Max A   Min A  Rolling bed level   Utilizing bed rails during LB ADLs   Functional/  Bathroom  Transfers Sit to stand: NT  Stand to sit: NT  Stand pivot: NT     Functional Mobility NT     Balance Sitting:     Static: NT    Dynamic: NT  Standing:     Static:  NT    Dynamic:NT     Endurance/  Activity Tolerance P+ activity tolerance/endurance during light ADL task; at bed level  demo F activity tolerance/endurance during [id number] min ADL task     G demo of EC, pacing and proper breathing techniques   Visual/  Perceptual Glasses: reports wearing all the time- not present. R eye redness noted. Hand dominance: Right       Strength ROM Additional Info:    RUE   4-/5 wfl Fair+  and fair FMC/dexterity noted during ADL tasks      LUE 4-/5 wfl Fair+  and fair FMC/dexterity noted during ADL tasks       Hearing: Wfl   Sensation:  Pt reports numbness in BUE/BLE. Tone: WFL  Edema: Unremarkable                            Comments/Treatment Narrative: Thorough chart review and evaluation completed with PT collaboration. Upon arrival patient supine with HOB slightly elevated. Patient agreeable to session. Pt required DEP x2 for repositioning to head of bed.  Pt then repositioned into upright chair position to increase upright sitting posture for UB ADL. Pt performed grooming tasks as stated above; BUE ROM assessment and reaching outside SOPHIE with min A for long sitting balance and VC/education to use bed rail to reposition self with good ability. Pt reports good comfort sitting upright. OK by RN for upright position and TAPS removed. Patient properly positioned into chair bed level to improve interaction with environment, increase upright sitting posture for ADLs and breathing; pillow placed on L side for midline trunk posture. After session, patient in position as stated above with all devices within reach, all lines and tubes intact. Pt required cues and education as noted above for safe facilitation and completion of tasks. During bed mobility and bed level ADLs pt educated on proper hand placement, posture safety technique, sequencing,  OT role, importance of completing UB ADL tasks daily as IND as possible to aide in recovery process and maintain BUE strength, fall risk precautions/call light use, and proper positioning in bed. Pt oriented to date, time, time of day, and situation. Eval Complexity:   · Moderate Complexity  · History: Expanded review of medical records and additional review of physical, cognitive, or psychosocial history related to current functional performance  · Exam: 3+ performance deficits  · Assistance/Modification: Min/mod assistance or modifications required to perform tasks. May have comorbidities that affect occupational performance.     Assessment of current deficits   Functional mobility [x]  ADLs [x] Strength [x]  Cognition [x]  Functional transfers  [x] IADLs [] Safety Awareness [x]  Endurance [x]  Fine Motor Coordination [x] Balance [x] Vision/perception [] Sensation [x]   Gross Motor Coordination [] ROM [] Delirium []                  Motor Control []    Plan of Care:  ADL retraining [x]   Equipment needs [x]   Neuromuscular re-education [] Energy Conservation Techniques [x]  Functional Transfer training [] Patient and/or Family Education [x]  Functional Mobility training []  Environmental Modifications [x]  Cognitive re-training [x]   Compensatory techniques for ADLs [x]  Splinting Needs []   Positioning to improve overall function [x]   Therapeutic Activity [x]                       Therapeutic Exercise  [x]  Visual/Perceptual: []    Delirium prevention/treatment  []   Other:  []    Rehab Potential: Good for established goals    Patient / Family Goal: None stated     Patient and/or family were instructed/educated on diagnosis, prognosis/goals and plan of care. Demonstrated good understanding, further information not needed. [] Malnutrition indicators have been identified and nursing has been notified to ensure a dietitian consult is ordered. Evaluation time includes thorough review of current medical information, gathering information on past medical & social history & PLOF, completion of standardized testing, informal observation of tasks, consultation with other medical professions/disciplines, assessment of data & development of POC/goals.      Moderate evaluation + 15 treatment minutes  Time in: 14:05  Time out: 14:20    [facility], OTR/L [id number]

## 2019-06-11 NOTE — PROGRESS NOTES
Subjective:  Feeling better No CP, SOB, F, V, D, P     Objective:    BP (!) 155/73   Pulse 66   Temp 98 °F (36.7 °C) (Temporal)   Resp 16   Ht 5' 6\" (1.676 m)   Wt 219 lb 6.4 oz (99.5 kg)   SpO2 100%   BMI 35.41 kg/m²     24HR INTAKE/OUTPUT:      Intake/Output Summary (Last 24 hours) at 6/11/2019 0830  Last data filed at 6/11/2019 0714  Gross per 24 hour   Intake 2520.17 ml   Output 3250 ml   Net -729.83 ml     nad confused  Heart:  RRR, no murmurs, gallops, or rubs. Lungs:  CTA bilaterally, no wheeze, rales or rhonchi  Abd: bowel sounds present, nontender, nondistended, no masses  Extrem:  No clubbing, cyanosis, or edema    Most Recent Labs  Lab Results   Component Value Date    WBC 7.2 06/11/2019    HGB 8.5 (L) 06/11/2019    HCT 27.8 (L) 06/11/2019     06/11/2019     06/11/2019    K 4.5 06/11/2019     06/11/2019    CREATININE 2.0 (H) 06/11/2019    BUN 36 (H) 06/11/2019    CO2 21 (L) 06/11/2019    GLUCOSE 128 (H) 06/11/2019    ALT <5 06/08/2019    AST 16 06/08/2019    INR 1.2 06/11/2019    TSH 1.850 11/27/2018    LABA1C 5.3 10/22/2017    LABMICR 249.7 (H) 04/02/2018     No results for input(s): MG in the last 72 hours. Lab Results   Component Value Date    CALCIUM 11.0 (H) 06/11/2019    PHOS 2.4 (L) 04/05/2018        CT Chest WO Contrast   Final Result   Extensive widespread infiltrative hypodense lesions in the liver   concerning for widespread malignancy with the extensive probably   metastatic malignancy in the upper abdomen. There is no indication for   intrathoracic malignancy. CT ABDOMEN PELVIS WO CONTRAST   Final Result   1. Widespread metastatic liver disease with very large size lesion is   present throughout the left lobe in particular and also in the right   lobe. 2. Additional prominent metastatic adenopathy are seen in the   periportal spaces/celiac axis.        3. Also metastatic adenopathy in lesser degree are present in the   midline kasey aortic retroperitoneum spaces. 4. Extensive uncomplicated diverticulosis seen in the left-sided   colon. No signs for acute diverticulitis. ALERT:  ABNORMAL REPORT. Assessment    Active Problems:    History of DVT (deep vein thrombosis)    JANETH (acute kidney injury) ([facility] [address]    Diabetes mellitus type 2, uncontrolled (HCC)    Hyperlipidemia LDL goal <100    CKD (chronic kidney disease) stage 3, GFR 30-59 ml/min (HCC)    GIB (gastrointestinal bleeding)    Metastases to the liver (HCC)    Warfarin-induced coagulopathy (HCC)    Hypercalcemia  Resolved Problems:    * No resolved hospital problems.  *        PLAN:    Admit  IV fluids  Reverse INR  Monitor H&H  Transfuse PRN maintain hemoglobin >= 7  IV PPI  Gen surgical consult   onc cs  -Plan for CT-guided biopsy of liver mass  Medications for other co morbidities cont as appropriate w dosage adjustments as necessary  DVT PPx SCD  DC planning       Electronically signed by Kortney Naidu MD on 6/11/2019 at 8:30 AM

## 2019-06-11 NOTE — PROGRESS NOTES
Nutrition Assessment    Type and Reason for Visit: Initial, Positive Nutrition Screen, Consult    Nutrition Recommendations: Modify current diet to Carbohydrate Controlled diet, start Glucerna BID    Nutrition Assessment: patient is moderately malnourished upon admit AEB mild muscle mass/fat loss and <75% intakes for 5 days. At further risk w/ stage II pressure ulcer, AMS and noted metastic liver CA. Recommend modify Bariatric diet as not appropriate at this time. Will start diabetic ONS BID. Malnutrition Assessment:  · Malnutrition Status: Meets the criteria for moderate malnutrition  · Context: Acute illness or injury  · Findings of the 6 clinical characteristics of malnutrition (Minimum of 2 out of 6 clinical characteristics is required to make the diagnosis of moderate or severe Protein Calorie Malnutrition based on AND/ASPEN Guidelines):  1. Energy Intake-Less than or equal to 75% of estimated energy requirement, Greater than or equal to 5 days    2. Weight Loss-Unable to assess, unable to assess  3. Fat Loss-Mild subcutaneous fat loss, Triceps, Orbital  4. Muscle Loss-Mild muscle mass loss, Clavicles (pectoralis and deltoids)  5. Fluid Accumulation-No significant fluid accumulation,    6.  Strength-Not measured    Nutrition Risk Level: Moderate    Nutrient Needs:  · Estimated Daily Total Kcal: 4639-1839(MSJ 1708 x 1.3 SF)  · Estimated Daily Protein (g): 75-95(1.3-1.5 gm/kg IBW)  · Estimated Daily Total Fluid (ml/day): 9357-0501(1 ml/kcal)    Nutrition Diagnosis:   · Problem:  Moderate malnutrition, In context of acute illness or injury  · Etiology: related to Catabolic illness(metastatic liver CA)     Signs and symptoms:  as evidenced by Intake 50-75%, Diet history of poor intake, Mild loss of subcutaneous fat, Mild muscle loss    Objective Information:  · Nutrition-Focused Physical Findings: A&Ox2, active BS, soft abd, weakness, edentulous, +1 non-pitting edema, fluids WDL  · Wound Type: Pressure

## 2019-06-11 NOTE — PLAN OF CARE
Problem: Malnutrition  (NI-5.2)  Goal: Food and/or Nutrient Delivery  Add Diabetic ONS BID  Description  Individualized approach for food/nutrient provision.   Outcome: Met This Shift

## 2019-06-11 NOTE — PROGRESS NOTES
Spoke with Dr. Porsha Cortes while on the unit and Dr. Porsha Cortes states that the pt is okay to down grade to a general floor at this time. Order placed.

## 2019-06-11 NOTE — PROGRESS NOTES
Physical Therapy  Initial Assessment     Name: Saúl Benavidez  : 1950  MRN: [id number]    Date of Service: 2019    Evaluating PT:  Rosario Kennedy, PT, DPT    Room #:  1522/1541-J  Diagnosis:  GIB   Reason for admission: INR 9.0, fatigue, abdominal pain  Precautions:  Falls, dependent baseline  Procedures: none  Equipment recommendations:  [employer], wheelchair    Pt resides at [facility] where he has been for years. Per pt and prior notes, pt has been non ambulatory for years. Uses hilda<>wheelchair and propels wc independently with UEs. Initial Evaluation  Date: 3/69   AM-PAC 6 Clicks    Was pt agreeable to Eval/treatment? Yes    Does pt have pain? Denies    Bed Mobility  Rolling: Dep 2  Supine to sit: NT  Sit to supine: NT  Scooting: Dep 2   Transfers Sit to stand: -  Stand to sit: -  Stand pivot: -   Ambulation    NA     Stair negotiation: ascended and descended  NT   ROM BUE:  See OT eval  BLE:  WFL   Strength BUE:  See OT eval  BLE grossly:  3-/5   Balance Sitting EOB:  -  Dynamic Standing:  -   Endurance  Poor      -Pt is A & O x 3  -Sensation:  Pt denies numbness and tingling to extremities  -Edema:  Unremarkable     Patient education  Pt educated on role of PT     Patient response to education:   Pt verbalized understanding Pt demonstrated skill Pt requires further education in this area   Yes   No      Assessment/Comments  ---Pt received supine in bed and was agreeable to session with OT collaboration. Pt is dependent and wheelchair bound which he has been for years. Scooting to [facility] completed for positioning and sat pt up in bed. At dependent baseline, to transfer back and continue use of hilda<>wheelchair and propel using UEs. No acute skilled PT needs. Pts/ family goals   1. Return to prior environment    Patient and or family understand(s) diagnosis, prognosis, and plan of care. Yes     PLAN  --pt dependent and at baseline. DC from PT, no needs.      Time in 1400  Time out  [address] Nicho Dumont PT, DPT  [id number]

## 2019-06-11 NOTE — PLAN OF CARE
Problem: Bleeding:  Goal: Will show no signs and symptoms of excessive bleeding  Description  Will show no signs and symptoms of excessive bleeding  Outcome: Met This Shift     Problem: SKIN INTEGRITY  Goal: Skin integrity is maintained or improved  Outcome: Met This Shift     Problem: Confusion - Acute:  Goal: Absence of continued neurological deterioration signs and symptoms  Description  Absence of continued neurological deterioration signs and symptoms  Outcome: Met This Shift     Problem: Injury - Risk of, Physical Injury:  Goal: Will remain free from falls  Description  Will remain free from falls  Outcome: Met This Shift     Problem: Mood - Altered:  Goal: Mood stable  Description  Mood stable  Outcome: Met This Shift  Goal: Absence of abusive behavior  Description  Absence of abusive behavior  Outcome: Met This Shift  Goal: Verbalizations of feeling emotionally comfortable while being cared for will increase  Description  Verbalizations of feeling emotionally comfortable while being cared for will increase  Outcome: Met This Shift     Problem: Psychomotor Activity - Altered:  Goal: Absence of psychomotor disturbance signs and symptoms  Description  Absence of psychomotor disturbance signs and symptoms  Outcome: Met This Shift     Problem: Falls - Risk of:  Goal: Will remain free from falls  Description  Will remain free from falls  Outcome: Met This Shift

## 2019-06-11 NOTE — CARE COORDINATION
SOCIAL WORK/CASEMANAGEMENT TRANSITION OF CARE PLANNING: pt is from [facility] and not sure pt wants to return. precert is not needed. I called mark who said she is a good friend and not family and he has no children. Pt has been at this snf for around 5 years. Will discuss with pt. PT and OT to eval.   I met with pt and he agreed to return to [facility].   Jenifer Agrawal  6/11/2019

## 2019-06-11 NOTE — FLOWSHEET NOTE
Inpatient Wound Care(Initial Consult)[id number]    Admit Date: 6/8/2019  4:45 PM    Reason for consult:  buttocks    Significant history:  Admitted from [facility] for elevated INR  Acute gastritis without hemorrhage 4/5/2018    Cellulitis     Chronic kidney disease      stage 3   Diabetes mellitus ([facility] [address]      type 2   Diverticulosis of colon 4/5/2018   DVT of leg (deep venous thrombosis) (HCC)      left     Hx of blood clots     Hyperlipidemia     Hypertension     Obesity     Polyneuropathy     Restless leg syndrome     Sepsis ([facility] [address]     Smoker    Findings:       06/11/19 1330   Wound 06/08/19  Left gluteal fold   Date First Assessed/Time First Assessed: 06/08/19 2100   Present on Hospital Admission: Yes  Location: (c)   Wound Location Orientation: Left  Wound Description (Comments): gluteal fold   Wound Image    Wound Pressure Stage  2   Dressing/Treatment Pharmaceutical agent (see MAR)   Wound Length (cm) 4.6 cm   Wound Width (cm) 4 cm   Wound Depth (cm) 0.1 cm   Wound Surface Area (cm^2) 18.4 cm^2   Change in Wound Size % (l*w) -820   Wound Volume (cm^3) 1.84 cm^3   Wound Assessment Red   Drainage Amount Scant   Drainage Description Serosanguinous   Odor None   Wound 06/08/19 Toe (Comment  which one) Right;Lateral   Date First Assessed/Time First Assessed: 06/08/19 2100   Present on Hospital Admission: Yes  Location: Toe (Comment  which one)  Wound Location Orientation: Right;Lateral   Dressing/Treatment Pharmaceutical agent (see MAR)  (aquacel,gauze)   Wound Length (cm) 1 cm   Wound Width (cm) 0.2 cm   Wound Depth (cm) 0.2 cm   Wound Surface Area (cm^2) 0.2 cm^2   Change in Wound Size % (l*w) 80   Wound Volume (cm^3) 0.04 cm^3   Wound Assessment Red   Drainage Amount Small   Drainage Description Serosanguinous   Odor None   Loren-wound Assessment Intact   Non-staged Wound Description Partial thickness   Red%Wound Bed 100      06/11/19 1305   Skin Integrity Site 2   Skin Integrity Location 2 Other (Comment)  (dry crusted areas)   Location 2 BLE   Skin Integrity Site 3   Skin Integrity Location 3 Other (Comment)  (erosion)    Location 3 scrotum,buttocks     **Informed Consent**    The patient has given verbal consent to have photos taken of left gluteal fold and inserted into their chart as part of their permanent medical record for purposes of documentation, treatment management and/or medical review. All Images taken on 6/11/19 of patient name: Theador Sicks were transmitted and stored on secured [employer] located within [facility] by a registered [employer] Mobile Application Device. Impression:    Stage 2 left gluteal fold    Plan:  Antifungal ointment to buttocks,scrotum  Comfort glide system,foam heel protectors  Bactroban to the right great toe,opticell  Continued preventive care.  Jessica [place] 6/11/2019 2:04 PM

## 2019-06-11 NOTE — PROGRESS NOTES
2019     CMP:    Lab Results   Component Value Date     2019    K 4.5 2019     2019    CO2 21 2019    BUN 36 2019    CREATININE 2.0 2019    GFRAA 40 2019    LABGLOM 33 2019    GLUCOSE 128 2019    GLUCOSE 173 2012    PROT 7.9 2019    LABALBU 3.5 2019    LABALBU 4.1 2012    CALCIUM 11.0 2019    BILITOT 0.2 2019    ALKPHOS 268 2019    AST 16 2019    ALT <5 2019     BMP:    Lab Results   Component Value Date     2019    K 4.5 2019     2019    CO2 21 2019    BUN 36 2019    LABALBU 3.5 2019    LABALBU 4.1 2012    CREATININE 2.0 2019    CALCIUM 11.0 2019    GFRAA 40 2019    LABGLOM 33 2019    GLUCOSE 128 2019    GLUCOSE 173 2012     Calcium:    Lab Results   Component Value Date    CALCIUM 11.0 2019     Phosphorus:    Lab Results   Component Value Date    PHOS 2.4 2018     Uric Acid:  No results found for: URICACID  U/A:    Lab Results   Component Value Date    NITRU POSITIVE 2019    COLORU Yellow 2019    PHUR 6.0 2019    WBCUA 5-10 2019    RBCUA 1-3 2019    BACTERIA MANY 2019    CLARITYU Clear 2019    SPECGRAV 1.015 2019    LEUKOCYTESUR SMALL 2019    UROBILINOGEN 0.2 2019    BILIRUBINUR Negative 2019    BLOODU SMALL 2019    GLUCOSEU 100 2019    KETUA Negative 2019    AMORPHOUS FEW 10/21/2017        Imaging:  Ct Abdomen Pelvis Wo Contrast    Result Date: 2019  Patient MRN:  [id number] : 1950 Age: 70 years Gender: Male Order Date:  2019 4:45 PM TECHNIQUE/NUMBER OF IMAGES/COMPARISON/CLINICAL HISTORY: CT abdomen pelvis Axial images were obtained with sagittal and coronal reconstructions Comparison 2017. Clinical history diffuse abdominal pain.  50-year-old male patient with a history of diverticulosis of the colon diabetes. FINDINGS: The liver is enlarged with lobulated outlines with multiple confluent areas of hypodensity are present large metastatic implant sac, the largest 1's are seen in the left lobe in a continuing measuring several centimeters. Also lesions measure several centimeters seen throughout the right lobe including the caudate lobe. The spleen appears to be with borderline size are. Portal vein measures 14 mm. The gallbladder is normally distended, it appears unremarkable. There is no dilatation of the biliary tree. Most of the biliary tract is distorted in the left elbow The mass effect present. There are also prominent to lymph nodes in the periportal spaces and in the area of the celiac axis. The pancreas demonstrates atrophy. There is fat replacement of the pancreas. The biliary tree and pancreatic ductal systems are not dilated. Adrenals not enlarged. Kidneys have a multifocal areas of scarring difficult to evaluate for any focal mass lesion without IV contrast. There is atrophy of the left kidney and one in the right kidney. Proper evaluation of the kidneys will required multiphase IV contrast. Ultrasound can be an alternative. There are also prominent lymph nodes in the periportal region although with a dominant central fat signs for some of them. Calcified atheroma changes are seen in the abdominal aorta there is no aneurysm formation There is diffuse thickening of the bladder wall. Prostate gland is mild to moderate enlarged. Seminal vesicles are symmetrical size. Extensive diverticulosis seen throughout the left-sided colon but there is no signs for acute diverticulitis. The small bowel has unremarkable appearance. No conspicuous ascites is identified. Lower lung bases demonstrate no significant findings. There is space are free. Degenerative changes are seen throughout the spine. There is a sclerotic area in the L2 vertebral body this could be suspicious for metastasis. Further evaluation with a nuclear medicine bone scan is initially recommended for a skeletal survey. Endovascular surgery changes are seen both hip joints. 1. Widespread metastatic liver disease with very large size lesion is present throughout the left lobe in particular and also in the right lobe. 2. Additional prominent metastatic adenopathy are seen in the periportal spaces/celiac axis. 3. Also metastatic adenopathy in lesser degree are present in the midline kasey aortic retroperitoneum spaces. 4. Extensive uncomplicated diverticulosis seen in the left-sided colon. No signs for acute diverticulitis. ALERT:  ABNORMAL REPORT. Results for Kenard Denver (MRN [id number]) as of 6/10/2019 14:12   Ref. Range 6/9/2019 05:50 6/9/2019 18:50   Chloride Latest Ref Range: Not Established mmol/L  40   Creatinine, Ur Latest Ref Range: 40 - 278 mg/dL  48   Osmolality, Ur Latest Ref Range: 300 - 900 mOsm/kg  318   Potassium, Ur Latest Ref Range: Not Established mmol/L  17.0   Sodium, Ur Latest Ref Range: Not Established mmol/L  45       Assessment  1. Acute kidney injury secondary to severe prerenal azotemia with worsening anemia, hypotension and poor by mouth intake  2. Hyperkalemia Resolved  3. Chronic kidney disease stage III B secondary to hypertensive nephrosclerosis and type II diabetes with a baseline creatinine of 2.0-2.4   4. Non-anion gap metabolic acidosis   5. Anemia secondary to chronic disease and iron deficiency   6. Metastatic liver cancer   7. Supratherapeutic INR   Plan    1.  Monitor renal function and urine output closely          Electronically signed by Savage Meng MD on 6/11/2019 at 11:00 AM

## 2019-06-11 NOTE — DISCHARGE INSTR - COC
Continuity of Care Form    Patient Name: Martha Jones   :  1950  MRN:  [id number]    Admit date:  2019  Discharge date:  19    Code Status Order: DNR-CCA   Advance Directives:   Nathaniel Dash 33 Directive Type of Healthcare Directive Copy in [address] Agent's Name Healthcare Agent's Phone Number    06/10/19 2006  No, patient does not have an advance directive for healthcare treatment  --  --  --  --  --    19  No, patient does not have an advance directive for healthcare treatment  --  Other (Comment) pt has no family or legal representative  --  --  --          Admitting Physician:  Ananya Mike MD  PCP: Queenie Medel DO    Discharging Nurse: [address] [place] [address] Unit/Room#: 8083/4276-I  Discharging Unit Phone Number:     Emergency Contact:   Extended Emergency Contact Information  Primary Emergency Contact: Dhara Fox  Address: [address] [place] [address].           [address] Phone: [phone]  Mobile Phone: [phone]  Relation: Other    Past Surgical History:  Past Surgical History:   Procedure Laterality Date    KNEE SURGERY      left    FL COLONOSCOPY FLX DX W/COLLJ SPEC WHEN PFRMD N/A 2018    COLONOSCOPY DIAGNOSTIC --TIME UNDETERMINED performed by Benjamín Ochoa MD at [facility] EGD TRANSORAL BIOPSY SINGLE/MULTIPLE N/A 2018    EGD BIOPSY performed by Benjamín Ochoa MD at [facility]       Immunization History:   Immunization History   Administered Date(s) Administered    Influenza Vaccine, unspecified formulation 10/12/2018    Tdap (Boostrix, Adacel) 2017, 2018       Active Problems:  Patient Active Problem List   Diagnosis Code    History of DVT (deep vein thrombosis) Z86.718    JANETH (acute kidney injury) ([facility] N17.9    Diabetes mellitus type 2, uncontrolled ([facility] E11.65    Hyperlipidemia LDL goal <100 E78.5    Acquired hypothyroidism E03.9    Anemia of chronic disease D63.8    Bradycardia R00.1    Morbid obesity (HCC) E66.01    CKD (chronic kidney disease) stage 3, GFR 30-59 ml/min (HCC) N18.3    GIB (gastrointestinal bleeding) K92.2    Metastases to the liver (HCC) C78.7    Warfarin-induced coagulopathy (HCC) D68.32, T45.515A    Hypercalcemia E83.52       Isolation/Infection:   Isolation          No Isolation             Nurse Assessment:  Last Vital Signs: BP (!) 132/59   Pulse 70   Temp 98.1 °F (36.7 °C) (Temporal)   Resp 16   Ht 5' 6\" (1.676 m)   Wt 219 lb 6.4 oz (99.5 kg)   SpO2 96%   BMI 35.41 kg/m²     Last documented pain score (0-10 scale): Pain Level: 0  Last Weight:   Wt Readings from Last 1 Encounters:   06/10/19 219 lb 6.4 oz (99.5 kg)     Mental Status:  Alert and disoriented    IV Access:  - None    Nursing Mobility/ADLs:  Walking   Assisted  Transfer  Assisted  Bathing  Assisted  Dressing  Assisted  Toileting  Assisted  Feeding  Independent  Med Admin  Assisted  Med Delivery   whole    Wound Care Documentation and Therapy:  Wound 06/04/17 Blister Leg Right (Active)   Number of days: 737       Wound 06/08/17 Foot Right; Outer; Lateral;Lower (Active)   Number of days: 733       Wound (Active)   Number of days:        Wound 06/05/17 Blister Knee Left; Outer (Active)   Number of days: 735       Wound 06/08/17 Foot Inner;Right (Active)   Number of days: 732       Wound 06/08/17 Blister Elbow Left (Active)   Number of days: 732       Wound 10/21/17 Leg Right; Lower (Active)   Number of days: 597       Wound 10/28/17 Skin tear Hand Posterior circular (Active)   Number of days: 591       Wound 04/03/18 Skin tear Arm Right (Active)   Number of days: 434       Wound 04/05/18 Skin tear Arm Left (Active)   Number of days: 432       Wound 11/27/18 Other (Comment) Toe (Comment  which one) Other (Comment) Deep cut toes pulling away from foot (Active)   Number of days: 196 Equipment (for information only, NOT a DME order):  walker, bedside commode and hospital bed  Other Treatments:Site: right great toe: clean with normal saline, apply Bactroban,opticell,gauze dressing . Change daily and prn ***left gluteal fold wound: micanazole ointment. Patient's personal belongings (please select all that are sent with patient):  None    RN SIGNATURE:  Electronically signed by Eros Machado RN on 6/13/19 at 9:52 AM    CASE MANAGEMENT/SOCIAL WORK SECTION    Inpatient Status Date: ***    Readmission Risk Assessment Score:  Readmission Risk              Risk of Unplanned Readmission:        24           Discharging to Facility/ Agency   · Name: [facility]. · Address:  · Phone:  · Fax:    Dialysis Facility (if applicable)   · Name:  · Address:  · Dialysis Schedule:  · Phone:  · Fax:    / signature: Electronically signed by KAYLI Morales on 6/11/2019 at 10:23 AM      PHYSICIAN SECTION    Prognosis: {Prognosis:9603590976}    Condition at Discharge: [facility] Patient Condition:[id number]}    Rehab Potential (if transferring to Rehab): {Prognosis:9210688522}    Recommended Labs or Other Treatments After Discharge: ***    Physician Certification: I certify the above information and transfer of Jeffery Chew  is necessary for the continuing treatment of the diagnosis listed and that he requires [facility] for greater 30 days.      Update Admission H&P: {CHP DME Changes in HPFAK:130427721}    PHYSICIAN SIGNATURE:  Electronically signed by Alexx Corey MD on 6/13/19 at 8:40 AM

## 2019-06-11 NOTE — PLAN OF CARE
Problem: Skin Integrity:  Goal: Absence of new skin breakdown  Description  Absence of new skin breakdown  Outcome: Met This Shift

## 2019-06-11 NOTE — PROGRESS NOTES
Attempt to call Gabby Larsen patient's next of kin, with no answer. Message left requesting a returned call.      Tiara Sheppard RN, BSN

## 2019-06-11 NOTE — PROGRESS NOTES
Consults                                   [facility]  Hematology/Oncology        Patient Name: Anneliese Espinoza  YOB: 1950  PCP: Angie Vincent DO   Referring Provider:      Reason for Consultation:   Chief Complaint   Patient presents with    Abnormal Lab     sent from [facility] for INR of 9      Subjective:  Feeling better posttransfusion. INR corrected to 1.2      History of Present Illness:    65-year-old man admitted through emergency room with elevated INR of 9.0 despite withholding his Coumadin at the [facility] facility  He has multiple comorbid conditions and had been on warfarin apparently for DVT of left lower extremity  He had been complaining of fatigue and epigastric discomfort with dark tarry stools  His last EGD and colonoscopy done in April 2018 was relevant for gastritis and severe diverticulosis. He reports 10 pounds weight loss over the past month  CT scan of abdomen and pelvis showed widespread metastatic multifocal deposits on the liver largest in the left hepatic lobe with metastatic intra-abdominal lymphadenopathy in the retroperitoneum as well as mesenteric lymphadenopathy periportal and celiac axis lymphadenopathy  He was given vitamin K in the emergency room. Hemoglobin had dropped from 9.1 on admission to 7.6 this morning. His INR is down to 2.5 today  CEA was normal at 3.6  His CMP showed a creatinine of 2.8 with an estimated GFR of 33 mL/m and his hepatic panel was relevant for elevation in alkaline phosphatase.  His serum lipase was slightly elevated at 100    Diagnostic Data:     Past Medical History:   Diagnosis Date    Acute gastritis without hemorrhage 4/5/2018    Cellulitis     left leg    Chronic kidney disease     stage 3    Diabetes mellitus ([facility]     type 2    Diverticulosis of colon 4/5/2018    DVT of leg (deep venous thrombosis) (HCC)     left    Gait abnormality     Hx of blood clots     Hyperlipidemia     Hypertension     Metastases to the liver ([facility] 6/9/2019    Obesity     Polyneuropathy     Restless leg syndrome     Sepsis ([facility]     Smoker        Patient Active Problem List    Diagnosis Date Noted    Hypercalcemia 06/11/2019    Moderate protein-calorie malnutrition ([facility] 06/11/2019    [facility] 06/09/2019    Warfarin-induced coagulopathy ([facility] 06/09/2019    GIB (gastrointestinal bleeding) 06/08/2019    CKD (chronic kidney disease) stage 3, GFR 30-59 ml/min (HCC) 11/27/2018    Bradycardia 11/26/2018    Morbid obesity ([facility] 11/26/2018    Diabetes mellitus type 2, uncontrolled ([facility] 04/02/2018    Hyperlipidemia LDL goal <100 04/02/2018    Acquired hypothyroidism 04/02/2018    Anemia of chronic disease 04/02/2018    JANETH (acute kidney injury) ([facility] 10/21/2017    History of DVT (deep vein thrombosis) 02/23/2016        Past Surgical History:   Procedure Laterality Date    KNEE SURGERY      left    [place] COLONOSCOPY FLX DX W/COLLJ SPEC WHEN PFRMD N/A 4/4/2018    COLONOSCOPY DIAGNOSTIC --TIME UNDETERMINED performed by Jelly Walters MD at [address] [place] [address] EGD TRANSORAL BIOPSY SINGLE/MULTIPLE N/A 4/4/2018    EGD BIOPSY performed by Jelly Walters MD at [facility] History  Family History   Family history unknown: Yes       Social History    TOBACCO:   reports that he quit smoking about 4 years ago. His smoking use included cigarettes. He has a 80.00 pack-year smoking history. He has never used smokeless tobacco.  ETOH:   reports that he does not drink alcohol. Home Medications  Prior to Admission medications    Medication Sig Start Date End Date Taking? Authorizing Provider   camphor-menthol JASWINDER GARCIA [facility]) 0.5-0.5 % lotion Apply topically as needed for Itching Apply topically as needed.    Yes Historical Provider, MD   neomycin-polymyxin-dexameth 3.5-41824-8.1 OINT Place 0.5 inches into both eyes 3 times daily   Yes Historical Provider, MD   melatonin 3 MG TABS tablet Take 5 mg by mouth nightly =12 units   351-400 =16 units    Greater than 400 =20 units and call MD   Yes Historical Provider, MD   vitamin D (ERGOCALCIFEROL) 07762 UNITS CAPS capsule Take 50,000 Units by mouth once a week Mondays  @ 8:30 AM   Yes Historical Provider, MD       Allergies  No Known Allergies    Review of Systems:    Relevant for epigastric pain, dark stools and recent weight loss      Objective  /72   Pulse 68   Temp 97.6 °F (36.4 °C) (Temporal)   Resp 18   Ht 5' 6\" (1.676 m)   Wt 219 lb 6.4 oz (99.5 kg)   SpO2 95%   BMI 35.41 kg/m²     Physical Exam:     General: AAO to person, place, time, in no acute distress  Head and neck : PERRLA, EOMI . Sclera non icteric. Oropharynx : Clear  Neck: no JVD,  no adenopathy,  Heart: Regular rate and regular rhythm, no murmur  Lungs: Clear to auscultation   Extremities: No edema,no cyanosis,   Abdomen: Soft, mild right upper quadrant tenderness, no rebound, no hepatosplenomegaly. Skin:  No rash. Neurologic:Cranial nerves grossly intact. No focal motor or sensory deficits .     Recent Laboratory Data-   Lab Results   Component Value Date    WBC 7.2 06/11/2019    HGB 8.5 (L) 06/11/2019    HCT 27.8 (L) 06/11/2019    MCV 88.3 06/11/2019     06/11/2019    LYMPHOPCT 18.4 (L) 06/11/2019    RBC 3.15 (L) 06/11/2019    MCH 27.0 06/11/2019    MCHC 30.6 (L) 06/11/2019    RDW 16.6 (H) 06/11/2019    NEUTOPHILPCT 65.1 06/11/2019    MONOPCT 9.5 06/11/2019    BASOPCT 0.6 06/11/2019    NEUTROABS 4.69 06/11/2019    LYMPHSABS 1.32 (L) 06/11/2019    MONOSABS 0.68 06/11/2019    EOSABS 0.28 06/11/2019    BASOSABS 0.04 06/11/2019       Lab Results   Component Value Date     06/11/2019    K 4.5 06/11/2019     06/11/2019    CO2 21 (L) 06/11/2019    BUN 36 (H) 06/11/2019    CREATININE 2.0 (H) 06/11/2019    GLUCOSE 128 (H) 06/11/2019    CALCIUM 11.0 (H) 06/11/2019    PROT 7.9 06/08/2019    LABALBU 3.5 06/08/2019    BILITOT 0.2 06/08/2019    ALKPHOS 268 (H) 06/08/2019    AST 16 Calcified atheroma changes are seen in the abdominal aorta there is no aneurysm formation There is diffuse thickening of the bladder wall. Prostate gland is mild to moderate enlarged. Seminal vesicles are symmetrical size. Extensive diverticulosis seen throughout the left-sided colon but there is no signs for acute diverticulitis. The small bowel has unremarkable appearance. No conspicuous ascites is identified. Lower lung bases demonstrate no significant findings. There is space are free. Degenerative changes are seen throughout the spine. There is a sclerotic area in the L2 vertebral body this could be suspicious for metastasis. Further evaluation with a nuclear medicine bone scan is initially recommended for a skeletal survey. Endovascular surgery changes are seen both hip joints. 1. Widespread metastatic liver disease with very large size lesion is present throughout the left lobe in particular and also in the right lobe. 2. Additional prominent metastatic adenopathy are seen in the periportal spaces/celiac axis. 3. Also metastatic adenopathy in lesser degree are present in the midline kasey aortic retroperitoneum spaces. 4. Extensive uncomplicated diverticulosis seen in the left-sided colon. No signs for acute diverticulitis. ALERT:  ABNORMAL REPORT.          ASSESSMENT/PLAN :  69-year-old man with multiple comorbid conditions including hypertension hyperlipidemia diabetes mellitus chronic kidney disease       1- Multifocal large metastatic deposits on the liver with associated mesenteric periportal celiac and retroperitoneal lymphadenopathy consistent with malignancy  Last EGD in 2018 showed gastritis and last colonoscopy in 2018 showed diverticulosis  Once INR is reversed CT-guided biopsy of liver lesion will be planned for tissue diagnosis  CT chest without contrast will be done  CEA normal  Ca 19-9 pending      2- Anemia multifactorial related to possible GI bleeding with history of gastritis and

## 2019-06-12 ENCOUNTER — APPOINTMENT (OUTPATIENT)
Dept: CT IMAGING | Age: 69
DRG: 436 | End: 2019-06-12
Payer: COMMERCIAL

## 2019-06-12 LAB
ANION GAP SERPL CALCULATED.3IONS-SCNC: 11 MMOL/L (ref 7–16)
BASOPHILS ABSOLUTE: 0.06 E9/L (ref 0–0.2)
BASOPHILS RELATIVE PERCENT: 0.8 % (ref 0–2)
BUN BLDV-MCNC: 29 MG/DL (ref 8–23)
CALCIUM SERPL-MCNC: 11.9 MG/DL (ref 8.6–10.2)
CHLORIDE BLD-SCNC: 101 MMOL/L (ref 98–107)
CHROMOGRANIN A: 1694 NG/ML (ref 0–95)
CO2: 23 MMOL/L (ref 22–29)
CREAT SERPL-MCNC: 1.8 MG/DL (ref 0.7–1.2)
EOSINOPHILS ABSOLUTE: 0.32 E9/L (ref 0.05–0.5)
EOSINOPHILS RELATIVE PERCENT: 4.1 % (ref 0–6)
GFR AFRICAN AMERICAN: 45
GFR NON-AFRICAN AMERICAN: 38 ML/MIN/1.73
GLUCOSE BLD-MCNC: 132 MG/DL (ref 74–99)
HCT VFR BLD CALC: 29.2 % (ref 37–54)
HEMOGLOBIN: 8.9 G/DL (ref 12.5–16.5)
IMMATURE GRANULOCYTES #: 0.19 E9/L
IMMATURE GRANULOCYTES %: 2.4 % (ref 0–5)
INR BLD: 1.2
LYMPHOCYTES ABSOLUTE: 1.57 E9/L (ref 1.5–4)
LYMPHOCYTES RELATIVE PERCENT: 19.9 % (ref 20–42)
MCH RBC QN AUTO: 27.1 PG (ref 26–35)
MCHC RBC AUTO-ENTMCNC: 30.5 % (ref 32–34.5)
MCV RBC AUTO: 88.8 FL (ref 80–99.9)
METER GLUCOSE: 128 MG/DL (ref 74–99)
METER GLUCOSE: 145 MG/DL (ref 74–99)
METER GLUCOSE: 146 MG/DL (ref 74–99)
METER GLUCOSE: 163 MG/DL (ref 74–99)
MONOCYTES ABSOLUTE: 0.84 E9/L (ref 0.1–0.95)
MONOCYTES RELATIVE PERCENT: 10.7 % (ref 2–12)
NEUTROPHILS ABSOLUTE: 4.89 E9/L (ref 1.8–7.3)
NEUTROPHILS RELATIVE PERCENT: 62.1 % (ref 43–80)
PDW BLD-RTO: 17 FL (ref 11.5–15)
PLATELET # BLD: 176 E9/L (ref 130–450)
PMV BLD AUTO: 10.4 FL (ref 7–12)
POTASSIUM SERPL-SCNC: 4.6 MMOL/L (ref 3.5–5)
PROTHROMBIN TIME: 13.7 SEC (ref 9.3–12.4)
RBC # BLD: 3.29 E12/L (ref 3.8–5.8)
SODIUM BLD-SCNC: 135 MMOL/L (ref 132–146)
WBC # BLD: 7.9 E9/L (ref 4.5–11.5)

## 2019-06-12 PROCEDURE — 85610 PROTHROMBIN TIME: CPT

## 2019-06-12 PROCEDURE — 6370000000 HC RX 637 (ALT 250 FOR IP): Performed by: INTERNAL MEDICINE

## 2019-06-12 PROCEDURE — 0FB03ZX EXCISION OF LIVER, PERCUTANEOUS APPROACH, DIAGNOSTIC: ICD-10-PCS | Performed by: RADIOLOGY

## 2019-06-12 PROCEDURE — 2500000003 HC RX 250 WO HCPCS: Performed by: RADIOLOGY

## 2019-06-12 PROCEDURE — 6360000004 HC RX CONTRAST MEDICATION: Performed by: RADIOLOGY

## 2019-06-12 PROCEDURE — 82962 GLUCOSE BLOOD TEST: CPT

## 2019-06-12 PROCEDURE — 2709999900 CT GUIDED NEEDLE PLACEMENT

## 2019-06-12 PROCEDURE — 47000 NEEDLE BIOPSY OF LIVER PERQ: CPT

## 2019-06-12 PROCEDURE — 85025 COMPLETE CBC W/AUTO DIFF WBC: CPT

## 2019-06-12 PROCEDURE — 36415 COLL VENOUS BLD VENIPUNCTURE: CPT

## 2019-06-12 PROCEDURE — 2580000003 HC RX 258

## 2019-06-12 PROCEDURE — 88341 IMHCHEM/IMCYTCHM EA ADD ANTB: CPT

## 2019-06-12 PROCEDURE — 88307 TISSUE EXAM BY PATHOLOGIST: CPT

## 2019-06-12 PROCEDURE — 6370000000 HC RX 637 (ALT 250 FOR IP): Performed by: RADIOLOGY

## 2019-06-12 PROCEDURE — 80048 BASIC METABOLIC PNL TOTAL CA: CPT

## 2019-06-12 PROCEDURE — 1200000000 HC SEMI PRIVATE

## 2019-06-12 PROCEDURE — 88342 IMHCHEM/IMCYTCHM 1ST ANTB: CPT

## 2019-06-12 RX ORDER — LIDOCAINE HYDROCHLORIDE 20 MG/ML
7 INJECTION, SOLUTION INFILTRATION; PERINEURAL ONCE
Status: COMPLETED | OUTPATIENT
Start: 2019-06-12 | End: 2019-06-12

## 2019-06-12 RX ORDER — SODIUM CHLORIDE 0.9 % (FLUSH) 0.9 %
SYRINGE (ML) INJECTION
Status: COMPLETED
Start: 2019-06-12 | End: 2019-06-12

## 2019-06-12 RX ADMIN — NEOMYCIN AND POLYMYXIN B SULFATES AND DEXAMETHASONE 0.5 INCH: 3.5; 10000; 1 OINTMENT OPHTHALMIC at 23:10

## 2019-06-12 RX ADMIN — INSULIN GLARGINE 10 UNITS: 100 INJECTION, SOLUTION SUBCUTANEOUS at 10:04

## 2019-06-12 RX ADMIN — INSULIN GLARGINE 10 UNITS: 100 INJECTION, SOLUTION SUBCUTANEOUS at 23:15

## 2019-06-12 RX ADMIN — DIVALPROEX SODIUM 125 MG: 125 TABLET, DELAYED RELEASE ORAL at 09:47

## 2019-06-12 RX ADMIN — Medication 10 ML: at 09:48

## 2019-06-12 RX ADMIN — PETROLATUM: 42 OINTMENT TOPICAL at 09:48

## 2019-06-12 RX ADMIN — Medication: at 09:48

## 2019-06-12 RX ADMIN — CARBIDOPA AND LEVODOPA 1 TABLET: 25; 100 TABLET ORAL at 17:28

## 2019-06-12 RX ADMIN — INSULIN LISPRO 2 UNITS: 100 INJECTION, SOLUTION INTRAVENOUS; SUBCUTANEOUS at 17:35

## 2019-06-12 RX ADMIN — DIVALPROEX SODIUM 125 MG: 125 TABLET, DELAYED RELEASE ORAL at 23:11

## 2019-06-12 RX ADMIN — SERTRALINE 100 MG: 100 TABLET, FILM COATED ORAL at 09:47

## 2019-06-12 RX ADMIN — NEOMYCIN AND POLYMYXIN B SULFATES AND DEXAMETHASONE 0.5 INCH: 3.5; 10000; 1 OINTMENT OPHTHALMIC at 17:30

## 2019-06-12 RX ADMIN — INSULIN LISPRO 1 UNITS: 100 INJECTION, SOLUTION INTRAVENOUS; SUBCUTANEOUS at 23:15

## 2019-06-12 RX ADMIN — CINACALCET HYDROCHLORIDE 30 MG: 30 TABLET, FILM COATED ORAL at 09:48

## 2019-06-12 RX ADMIN — NEOMYCIN AND POLYMYXIN B SULFATES AND DEXAMETHASONE 0.5 INCH: 3.5; 10000; 1 OINTMENT OPHTHALMIC at 09:48

## 2019-06-12 RX ADMIN — PREDNISONE 2.5 MG: 5 TABLET ORAL at 09:47

## 2019-06-12 RX ADMIN — IOPAMIDOL 1 ML: 612 INJECTION, SOLUTION INTRAVENOUS at 14:45

## 2019-06-12 RX ADMIN — LEVOTHYROXINE SODIUM 25 MCG: 25 TABLET ORAL at 06:35

## 2019-06-12 RX ADMIN — AMLODIPINE BESYLATE 5 MG: 5 TABLET ORAL at 09:47

## 2019-06-12 RX ADMIN — FAMOTIDINE 20 MG: 20 TABLET ORAL at 09:47

## 2019-06-12 RX ADMIN — DAPSONE 25 MG: 25 TABLET ORAL at 23:11

## 2019-06-12 RX ADMIN — PRAVASTATIN SODIUM 20 MG: 20 TABLET ORAL at 09:47

## 2019-06-12 RX ADMIN — DAPSONE 50 MG: 25 TABLET ORAL at 09:47

## 2019-06-12 RX ADMIN — DIVALPROEX SODIUM 125 MG: 125 TABLET, DELAYED RELEASE ORAL at 17:28

## 2019-06-12 RX ADMIN — ROPINIROLE HYDROCHLORIDE 0.25 MG: 0.25 TABLET, FILM COATED ORAL at 23:11

## 2019-06-12 RX ADMIN — CARBIDOPA AND LEVODOPA 1 TABLET: 25; 100 TABLET ORAL at 23:11

## 2019-06-12 RX ADMIN — Medication: at 14:45

## 2019-06-12 RX ADMIN — CARBIDOPA AND LEVODOPA 1 TABLET: 25; 100 TABLET ORAL at 09:47

## 2019-06-12 RX ADMIN — LIDOCAINE HYDROCHLORIDE 7 ML: 20 INJECTION, SOLUTION INFILTRATION; PERINEURAL at 15:07

## 2019-06-12 RX ADMIN — MUPIROCIN: 20 OINTMENT TOPICAL at 09:48

## 2019-06-12 ASSESSMENT — PAIN SCALES - GENERAL
PAINLEVEL_OUTOF10: 0
PAINLEVEL_OUTOF10: 0
PAINLEVEL_OUTOF10: 6
PAINLEVEL_OUTOF10: 0

## 2019-06-12 ASSESSMENT — PAIN DESCRIPTION - ORIENTATION: ORIENTATION: RIGHT;OUTER;UPPER

## 2019-06-12 ASSESSMENT — PAIN DESCRIPTION - PAIN TYPE: TYPE: ACUTE PAIN;SURGICAL PAIN

## 2019-06-12 ASSESSMENT — PAIN DESCRIPTION - LOCATION: LOCATION: ABDOMEN

## 2019-06-12 NOTE — INTERVAL H&P NOTE
H&P Update    Patient's History and Physical  was reviewed. The patient appears likely to able to tolerate the procedure. Risk and benefits discussed including ultimate complications, possibly death and consent obtained.     Jessica Mccauley, II

## 2019-06-12 NOTE — PROGRESS NOTES
1315 - Patient in cart in Radiology department for Liver biopsy. Martita Fox returned phone call, she is not a blood relative. Spouse is  and patient has no siblings or children. Allergies, medications, H&P and fasting instructions reviewed. Vital signs taken. Procedural instructions given, questions answered, understanding expressed and consent previously obtained. 1410 - Patient positioned supine on Cat Scan table with O2 and monitoring devices attached.

## 2019-06-12 NOTE — PRE SEDATION
Amparo Ulloa II, MD  6/12/2019  2:38 PM        PRE-SEDATION PHYSICIAN ASSESSMENT:      1. HISTORY & PHYSICAL EXAMINATION:  Comments: none    Vitals:    06/12/19 1334   BP:    Pulse:    Resp:    Temp: 98.2 °F (36.8 °C)   SpO2:        Allergies: Patient has no known allergies. 2. Heart and Lungs immediately prior to procedure demonstrate no contraindications to proceed      Chief Complaint: <principal problem not specified>    Drug: unknown  Tobacco: unknown    3. PAST ANESTHESIA EXPERIENCE:  unknown. 4. AIRWAY/TEETH/HEAD & NECK(Mallampati Classification):  II (soft palate, uvula, fauces visible)    5: NORMAL RANGE OF MOTION OF NECK: No    6. PATIENT WILL LIKELY TOLERATE PLAN OF MODERATE SEDATION    7. ASA 2.     Smith Curran MD

## 2019-06-12 NOTE — POST SEDATION
POST SEDATION NOTE:  Time: 2:38 PM    Cardiopulmonary: Vitals Signs Stable: yes    Level of Consciousness: alert    Reversal Agent Used: No    Complications: none    Follow-up/Observations: none    Pain Score: 1    Sunday Nam MD

## 2019-06-12 NOTE — PROGRESS NOTES
Subjective:  Feeling better No CP, SOB, F, V, D, P     Objective:    BP (!) 121/58   Pulse 67   Temp 97.5 °F (36.4 °C) (Temporal)   Resp 16   Ht 5' 6\" (1.676 m)   Wt 219 lb 6.4 oz (99.5 kg)   SpO2 96%   BMI 35.41 kg/m²     24HR INTAKE/OUTPUT:      Intake/Output Summary (Last 24 hours) at 6/12/2019 0819  Last data filed at 6/12/2019 0544  Gross per 24 hour   Intake 660 ml   Output 2075 ml   Net -1415 ml     nad confused  Heart:  RRR, no murmurs, gallops, or rubs. Lungs:  CTA bilaterally, no wheeze, rales or rhonchi  Abd: bowel sounds present, nontender, nondistended, no masses  Extrem:  No clubbing, cyanosis, or edema    Most Recent Labs  Lab Results   Component Value Date    WBC 7.9 06/12/2019    HGB 8.9 (L) 06/12/2019    HCT 29.2 (L) 06/12/2019     06/12/2019     06/12/2019    K 4.6 06/12/2019     06/12/2019    CREATININE 1.8 (H) 06/12/2019    BUN 29 (H) 06/12/2019    CO2 23 06/12/2019    GLUCOSE 132 (H) 06/12/2019    ALT <5 06/08/2019    AST 16 06/08/2019    INR 1.2 06/12/2019    TSH 1.850 11/27/2018    LABA1C 5.3 10/22/2017    LABMICR 249.7 (H) 04/02/2018     No results for input(s): MG in the last 72 hours. Lab Results   Component Value Date    CALCIUM 11.9 (H) 06/12/2019    PHOS 2.4 (L) 04/05/2018        CT Chest WO Contrast   Final Result   Extensive widespread infiltrative hypodense lesions in the liver   concerning for widespread malignancy with the extensive probably   metastatic malignancy in the upper abdomen. There is no indication for   intrathoracic malignancy. CT ABDOMEN PELVIS WO CONTRAST   Final Result   1. Widespread metastatic liver disease with very large size lesion is   present throughout the left lobe in particular and also in the right   lobe. 2. Additional prominent metastatic adenopathy are seen in the   periportal spaces/celiac axis.        3. Also metastatic adenopathy in lesser degree are present in the   midline kasey aortic retroperitoneum spaces. 4. Extensive uncomplicated diverticulosis seen in the left-sided   colon. No signs for acute diverticulitis. ALERT:  ABNORMAL REPORT. CT GUIDED NEEDLE PLACEMENT    (Results Pending)       Assessment    Active Problems:    History of DVT (deep vein thrombosis)    JANETH (acute kidney injury) (HCC)    Diabetes mellitus type 2, uncontrolled (HCC)    Hyperlipidemia LDL goal <100    CKD (chronic kidney disease) stage 3, GFR 30-59 ml/min (HCC)    GIB (gastrointestinal bleeding)    Metastases to the liver (HCC)    Warfarin-induced coagulopathy (HCC)    Hypercalcemia    Moderate protein-calorie malnutrition (HCC)  Resolved Problems:    * No resolved hospital problems. *        PLAN:    Admit  IV fluids  Reverse INR  Monitor H&H  Transfuse PRN maintain hemoglobin >= 7  IV PPI  Gen surgical consult   onc cs  -Plan for CT-guided biopsy of liver mass  Medications for other co morbidities cont as appropriate w dosage adjustments as necessary  DVT PPx SCD  DC planning WILLARD after CT bx.       Electronically signed by Osmar Hughes MD on 6/12/2019 at 8:19 AM

## 2019-06-12 NOTE — BRIEF OP NOTE
Brief Postoperative Note    Hunter Blanco  YOB: 1950  [id number]    Pre-operative Diagnosis and Procedure: liver    Post-operative Diagnosis: Same    Anesthesia: Local    Estimated Blood Loss: < 10 cc    Surgeon: Juan PATRICIO     Complications: none    Specimen obtained: yes    Findings: none     Jose Dinh II   6/12/2019 2:38 PM

## 2019-06-12 NOTE — PROGRESS NOTES
Consults                                   [facility]  Hematology/Oncology        Patient Name: Pierce Ellis  YOB: 1950  PCP: Maddy Kaur DO   Referring Provider:      Reason for Consultation:   Chief Complaint   Patient presents with    Abnormal Lab     sent from [facility] for INR of 9      Subjective:  Feeling better posttransfusion. INR corrected to 1.2      History of Present Illness:    43-year-old man admitted through emergency room with elevated INR of 9.0 despite withholding his Coumadin at the [facility] [place]  He has multiple comorbid conditions and had been on warfarin apparently for DVT of left lower extremity  He had been complaining of fatigue and epigastric discomfort with dark tarry stools  His last EGD and colonoscopy done in April 2018 was relevant for gastritis and severe diverticulosis. He reports 10 pounds weight loss over the past month  CT scan of abdomen and pelvis showed widespread metastatic multifocal deposits on the liver largest in the left hepatic lobe with metastatic intra-abdominal lymphadenopathy in the retroperitoneum as well as mesenteric lymphadenopathy periportal and celiac axis lymphadenopathy  He was given vitamin K in the emergency room. Hemoglobin had dropped from 9.1 on admission to 7.6 this morning. His INR is down to 2.5 today  CEA was normal at 3.6  His CMP showed a creatinine of 2.8 with an estimated GFR of 33 mL/m and his hepatic panel was relevant for elevation in alkaline phosphatase.  His serum lipase was slightly elevated at 100    Diagnostic Data:     Past Medical History:   Diagnosis Date    Acute gastritis without hemorrhage 4/5/2018    Cellulitis     left leg    Chronic kidney disease     stage 3    Diabetes mellitus ([facility]     type 2    Diverticulosis of colon 4/5/2018    DVT of leg (deep venous thrombosis) (HCC)     left    Gait abnormality     Hx of blood clots     Hyperlipidemia     Hypertension     Metastases to the liver ([facility] 6/9/2019    Obesity     Polyneuropathy     Restless leg syndrome     Sepsis ([facility]     Smoker        Patient Active Problem List    Diagnosis Date Noted    Hypercalcemia 06/11/2019    Moderate protein-calorie malnutrition ([facility] 06/11/2019    [facility] 06/09/2019    Warfarin-induced coagulopathy ([facility] 06/09/2019    GIB (gastrointestinal bleeding) 06/08/2019    CKD (chronic kidney disease) stage 3, GFR 30-59 ml/min (HCC) 11/27/2018    Bradycardia 11/26/2018    Morbid obesity ([facility] 11/26/2018    Diabetes mellitus type 2, uncontrolled ([facility] 04/02/2018    Hyperlipidemia LDL goal <100 04/02/2018    Acquired hypothyroidism 04/02/2018    Anemia of chronic disease 04/02/2018    JANETH (acute kidney injury) ([facility] 10/21/2017    History of DVT (deep vein thrombosis) 02/23/2016        Past Surgical History:   Procedure Laterality Date    KNEE SURGERY      left    OH COLONOSCOPY FLX DX W/COLLJ SPEC WHEN PFRMD N/A 4/4/2018    COLONOSCOPY DIAGNOSTIC --TIME UNDETERMINED performed by Meera Carrasco MD at [address] [place] [address] EGD TRANSORAL BIOPSY SINGLE/MULTIPLE N/A 4/4/2018    EGD BIOPSY performed by Meera Carrasco MD at [facility] History  Family History   Family history unknown: Yes       Social History    TOBACCO:   reports that he quit smoking about 4 years ago. His smoking use included cigarettes. He has a 80.00 pack-year smoking history. He has never used smokeless tobacco.  ETOH:   reports that he does not drink alcohol. Home Medications  Prior to Admission medications    Medication Sig Start Date End Date Taking? Authorizing Provider   camphor-menthol JASWINDER GARCIA [facility]) 0.5-0.5 % lotion Apply topically as needed for Itching Apply topically as needed.    Yes Historical Provider, MD   neomycin-polymyxin-dexameth 3.5-02464-1.1 OINT Place 0.5 inches into both eyes 3 times daily   Yes Historical Provider, MD   melatonin 3 MG TABS tablet Take 5 mg by mouth nightly =12 units   351-400 =16 units    Greater than 400 =20 units and call MD   Yes Historical Provider, MD   vitamin D (ERGOCALCIFEROL) 86330 UNITS CAPS capsule Take 50,000 Units by mouth once a week Mondays  @ 8:30 AM   Yes Historical Provider, MD       Allergies  No Known Allergies    Review of Systems:    Relevant for epigastric pain, dark stools and recent weight loss      Objective  BP (!) 144/75   Pulse 70   Temp 98.2 °F (36.8 °C)   Resp 18   Ht 5' 6\" (1.676 m)   Wt 219 lb 6.4 oz (99.5 kg)   SpO2 95%   BMI 35.41 kg/m²     Physical Exam:     General: AAO to person, place, time, in no acute distress  Head and neck : PERRLA, EOMI . Sclera non icteric. Oropharynx : Clear  Neck: no JVD,  no adenopathy,  Heart: Regular rate and regular rhythm, no murmur  Lungs: Clear to auscultation   Extremities: No edema,no cyanosis,   Abdomen: Soft, mild right upper quadrant tenderness, no rebound, no hepatosplenomegaly. Skin:  No rash. Neurologic:Cranial nerves grossly intact. No focal motor or sensory deficits .     Recent Laboratory Data-   Lab Results   Component Value Date    WBC 7.9 06/12/2019    HGB 8.9 (L) 06/12/2019    HCT 29.2 (L) 06/12/2019    MCV 88.8 06/12/2019     06/12/2019    LYMPHOPCT 19.9 (L) 06/12/2019    RBC 3.29 (L) 06/12/2019    MCH 27.1 06/12/2019    MCHC 30.5 (L) 06/12/2019    RDW 17.0 (H) 06/12/2019    NEUTOPHILPCT 62.1 06/12/2019    MONOPCT 10.7 06/12/2019    BASOPCT 0.8 06/12/2019    NEUTROABS 4.89 06/12/2019    LYMPHSABS 1.57 06/12/2019    MONOSABS 0.84 06/12/2019    EOSABS 0.32 06/12/2019    BASOSABS 0.06 06/12/2019       Lab Results   Component Value Date     06/12/2019    K 4.6 06/12/2019     06/12/2019    CO2 23 06/12/2019    BUN 29 (H) 06/12/2019    CREATININE 1.8 (H) 06/12/2019    GLUCOSE 132 (H) 06/12/2019    CALCIUM 11.9 (H) 06/12/2019    PROT 7.9 06/08/2019    LABALBU 3.5 06/08/2019    BILITOT 0.2 06/08/2019    ALKPHOS 268 (H) 06/08/2019    AST 16 06/08/2019    ALT <5 are seen in the abdominal aorta there is no aneurysm formation There is diffuse thickening of the bladder wall. Prostate gland is mild to moderate enlarged. Seminal vesicles are symmetrical size. Extensive diverticulosis seen throughout the left-sided colon but there is no signs for acute diverticulitis. The small bowel has unremarkable appearance. No conspicuous ascites is identified. Lower lung bases demonstrate no significant findings. There is space are free. Degenerative changes are seen throughout the spine. There is a sclerotic area in the L2 vertebral body this could be suspicious for metastasis. Further evaluation with a nuclear medicine bone scan is initially recommended for a skeletal survey. Endovascular surgery changes are seen both hip joints. 1. Widespread metastatic liver disease with very large size lesion is present throughout the left lobe in particular and also in the right lobe. 2. Additional prominent metastatic adenopathy are seen in the periportal spaces/celiac axis. 3. Also metastatic adenopathy in lesser degree are present in the midline kasey aortic retroperitoneum spaces. 4. Extensive uncomplicated diverticulosis seen in the left-sided colon. No signs for acute diverticulitis. ALERT:  ABNORMAL REPORT.          ASSESSMENT/PLAN :  39-year-old man with multiple comorbid conditions including hypertension hyperlipidemia diabetes mellitus chronic kidney disease       1- Multifocal large metastatic deposits on the liver with associated mesenteric periportal celiac and retroperitoneal lymphadenopathy consistent with malignancy  Last EGD in 2018 showed gastritis and last colonoscopy in 2018 showed diverticulosis  Once INR is reversed CT-guided biopsy of liver lesion will be planned for tissue diagnosis  CT chest without contrast will be done  CEA normal  Ca 19-9 pending      2- Anemia multifactorial related to possible GI bleeding with history of gastritis and anticoagulation and contributed to by chronic kidney disease stage IV. To check iron studies cystic, B12 and folate and blood smear review  Hemoglobin improved posttransfusion to 8.5    3- Moderate hypercalcemia  Rule out hypercalcemia of malignancy versus secondary hyperparathyroidism secondary to CKD  To check PTH related peptide  Serum calcium improved and down to 11.0    - LDH elevated at 470  - Iron studies consistent with anemia of chronic disease  - INR corrected to 1.3  - PTH related peptide pending  - CT chest was negative for intrathoracic malignancy  - CT guided bx done today  - To follow with Dr. Thornton Living as outpatient and discuss with him prognosis once tissue diagnosis established.  OK to DC from oncology now that bx is complete    Paul Barr MD  Electronically signed 6/12/2019 at 1:41 PM

## 2019-06-12 NOTE — PROGRESS NOTES
glucose (GLUTOSE) 40 % oral gel 15 g PRN   dextrose 50 % IV solution PRN   glucagon (rDNA) injection 1 mg PRN   dextrose 5 % solution PRN   acetaminophen (TYLENOL) tablet 650 mg Q4H PRN   levothyroxine (SYNTHROID) tablet 25 mcg QAM   pravastatin (PRAVACHOL) tablet 20 mg Daily   vitamin D (ERGOCALCIFEROL) capsule 50,000 Units Weekly   famotidine (PEPCID) tablet 20 mg Daily   dapsone tablet 25 mg Daily   predniSONE (DELTASONE) tablet 2.5 mg Daily   amLODIPine (NORVASC) tablet 5 mg Daily   sertraline (ZOLOFT) tablet 100 mg Daily   insulin glargine (LANTUS) injection vial 10 Units BID   rOPINIRole (REQUIP) tablet 0.25 mg Nightly   divalproex (DEPAKOTE) DR tablet 125 mg TID   cinacalcet (SENSIPAR) tablet 30 mg Daily   carbidopa-levodopa (SINEMET)  MG per tablet 1 tablet TID   dapsone tablet 50 mg Daily       Review of Systems:   10 point review of system done at this time is negative except for the things mentioned in the HPI    Physical exam:   Constitutional:    Vitals: BP (!) 144/75   Pulse 70   Temp 98.2 °F (36.8 °C)   Resp 18   Ht 5' 6\" (1.676 m)   Wt 219 lb 6.4 oz (99.5 kg)   SpO2 95%   BMI 35.41 kg/m²        Data:   Labs:  CBC:   Lab Results   Component Value Date    WBC 7.9 06/12/2019    RBC 3.29 06/12/2019    HGB 8.9 06/12/2019    HCT 29.2 06/12/2019    MCV 88.8 06/12/2019    MCH 27.1 06/12/2019    MCHC 30.5 06/12/2019    RDW 17.0 06/12/2019     06/12/2019    MPV 10.4 06/12/2019     CMP:    Lab Results   Component Value Date     06/12/2019    K 4.6 06/12/2019     06/12/2019    CO2 23 06/12/2019    BUN 29 06/12/2019    CREATININE 1.8 06/12/2019    GFRAA 45 06/12/2019    LABGLOM 38 06/12/2019    GLUCOSE 132 06/12/2019    GLUCOSE 173 01/23/2012    PROT 7.9 06/08/2019    LABALBU 3.5 06/08/2019    LABALBU 4.1 01/22/2012    CALCIUM 11.9 06/12/2019    BILITOT 0.2 06/08/2019    ALKPHOS 268 06/08/2019    AST 16 06/08/2019    ALT <5 06/08/2019     BMP:    Lab Results   Component Value Date     2019    K 4.6 2019     2019    CO2 23 2019    BUN 29 2019    LABALBU 3.5 2019    LABALBU 4.1 2012    CREATININE 1.8 2019    CALCIUM 11.9 2019    GFRAA 45 2019    LABGLOM 38 2019    GLUCOSE 132 2019    GLUCOSE 173 2012     Calcium:    Lab Results   Component Value Date    CALCIUM 11.9 2019     Phosphorus:    Lab Results   Component Value Date    PHOS 2.4 2018     Uric Acid:  No results found for: URICACID  U/A:    Lab Results   Component Value Date    NITRU POSITIVE 2019    COLORU Yellow 2019    PHUR 6.0 2019    WBCUA 5-10 2019    RBCUA 1-3 2019    BACTERIA MANY 2019    CLARITYU Clear 2019    SPECGRAV 1.015 2019    LEUKOCYTESUR SMALL 2019    UROBILINOGEN 0.2 2019    BILIRUBINUR Negative 2019    BLOODU SMALL 2019    GLUCOSEU 100 2019    KETUA Negative 2019    AMORPHOUS FEW 10/21/2017        Imaging:  Ct Abdomen Pelvis Wo Contrast    Result Date: 2019  Patient MRN:  [id number] : 1950 Age: 70 years Gender: Male Order Date:  2019 4:45 PM TECHNIQUE/NUMBER OF IMAGES/COMPARISON/CLINICAL HISTORY: CT abdomen pelvis Axial images were obtained with sagittal and coronal reconstructions Comparison 2017. Clinical history diffuse abdominal pain. 39-year-old male patient with a history of diverticulosis of the colon diabetes. FINDINGS: The liver is enlarged with lobulated outlines with multiple confluent areas of hypodensity are present large metastatic implant sac, the largest 1's are seen in the left lobe in a continuing measuring several centimeters. Also lesions measure several centimeters seen throughout the right lobe including the caudate lobe. The spleen appears to be with borderline size are. Portal vein measures 14 mm. The gallbladder is normally distended, it appears unremarkable.  There is no dilatation of the biliary tree. Most of the biliary tract is distorted in the left elbow The mass effect present. There are also prominent to lymph nodes in the periportal spaces and in the area of the celiac axis. The pancreas demonstrates atrophy. There is fat replacement of the pancreas. The biliary tree and pancreatic ductal systems are not dilated. Adrenals not enlarged. Kidneys have a multifocal areas of scarring difficult to evaluate for any focal mass lesion without IV contrast. There is atrophy of the left kidney and one in the right kidney. Proper evaluation of the kidneys will required multiphase IV contrast. Ultrasound can be an alternative. There are also prominent lymph nodes in the periportal region although with a dominant central fat signs for some of them. Calcified atheroma changes are seen in the abdominal aorta there is no aneurysm formation There is diffuse thickening of the bladder wall. Prostate gland is mild to moderate enlarged. Seminal vesicles are symmetrical size. Extensive diverticulosis seen throughout the left-sided colon but there is no signs for acute diverticulitis. The small bowel has unremarkable appearance. No conspicuous ascites is identified. Lower lung bases demonstrate no significant findings. There is space are free. Degenerative changes are seen throughout the spine. There is a sclerotic area in the L2 vertebral body this could be suspicious for metastasis. Further evaluation with a nuclear medicine bone scan is initially recommended for a skeletal survey. Endovascular surgery changes are seen both hip joints. 1. Widespread metastatic liver disease with very large size lesion is present throughout the left lobe in particular and also in the right lobe. 2. Additional prominent metastatic adenopathy are seen in the periportal spaces/celiac axis. 3. Also metastatic adenopathy in lesser degree are present in the midline kasey aortic retroperitoneum spaces.  4. Extensive uncomplicated diverticulosis seen in the left-sided colon. No signs for acute diverticulitis. ALERT:  ABNORMAL REPORT. Results for Saida Horner (MRN [id number]) as of 6/10/2019 14:12   Ref. Range 6/9/2019 05:50 6/9/2019 18:50   Chloride Latest Ref Range: Not Established mmol/L  40   Creatinine, Ur Latest Ref Range: 40 - 278 mg/dL  48   Osmolality, Ur Latest Ref Range: 300 - 900 mOsm/kg  318   Potassium, Ur Latest Ref Range: Not Established mmol/L  17.0   Sodium, Ur Latest Ref Range: Not Established mmol/L  45       Assessment  1. Acute kidney injury secondary to severe prerenal azotemia with worsening anemia, hypotension and poor by mouth intake  2. Hyperkalemia Resolved  3. Chronic kidney disease stage III B secondary to hypertensive nephrosclerosis and type II diabetes with a baseline creatinine of 2.0-2.4   4. Non-anion gap metabolic acidosis   5. Anemia secondary to chronic disease and iron deficiency   6. Metastatic liver cancer   7. Supratherapeutic INR   8. Hypercalcemia: multifactorial/ old pth high/ currently Ca+ mets     Plan    1. Monitor renal function and urine output closely  2. Cr continues to stabilize close to baseline   3. Avoid IVF now   4. Check PTH again   5. Continue sensipar for now   6. Will increase sensipar if PTH running high still   7.  Overall extremely poor prognosis             Electronically signed by Kenia Willis MD on 6/12/2019 at 2:17 PM

## 2019-06-12 NOTE — PROGRESS NOTES
1425- Patient scanned and   1429-images reviewed by Dr. Frazier December   1430-Patient prepped and draped. 1443-With the guidance of Cat Scan, needle inserted RUQ and core biopsy x3 taken by Dr. Frazier December. 1444-Patient re-scanned and images reviewed by Dr. Frazier December. 1447-Puncture site cleansed and dry dressing applied. Procedure completed   1500-Patient transported to recovery. Biopsy sample taken to laboratory.

## 2019-06-13 VITALS
DIASTOLIC BLOOD PRESSURE: 63 MMHG | HEIGHT: 66 IN | OXYGEN SATURATION: 98 % | TEMPERATURE: 98.6 F | RESPIRATION RATE: 16 BRPM | BODY MASS INDEX: 35.26 KG/M2 | HEART RATE: 84 BPM | WEIGHT: 219.4 LBS | SYSTOLIC BLOOD PRESSURE: 125 MMHG

## 2019-06-13 LAB
ANION GAP SERPL CALCULATED.3IONS-SCNC: 11 MMOL/L (ref 7–16)
BASOPHILS ABSOLUTE: 0.06 E9/L (ref 0–0.2)
BASOPHILS RELATIVE PERCENT: 0.7 % (ref 0–2)
BUN BLDV-MCNC: 26 MG/DL (ref 8–23)
CALCIUM SERPL-MCNC: 12.7 MG/DL (ref 8.6–10.2)
CHLORIDE BLD-SCNC: 104 MMOL/L (ref 98–107)
CO2: 23 MMOL/L (ref 22–29)
CREAT SERPL-MCNC: 2 MG/DL (ref 0.7–1.2)
EOSINOPHILS ABSOLUTE: 0.21 E9/L (ref 0.05–0.5)
EOSINOPHILS RELATIVE PERCENT: 2.6 % (ref 0–6)
GFR AFRICAN AMERICAN: 40
GFR NON-AFRICAN AMERICAN: 33 ML/MIN/1.73
GLUCOSE BLD-MCNC: 118 MG/DL (ref 74–99)
HCT VFR BLD CALC: 29.3 % (ref 37–54)
HEMOGLOBIN: 8.7 G/DL (ref 12.5–16.5)
IMMATURE GRANULOCYTES #: 0.18 E9/L
IMMATURE GRANULOCYTES %: 2.2 % (ref 0–5)
INR BLD: 1.3
LYMPHOCYTES ABSOLUTE: 1.41 E9/L (ref 1.5–4)
LYMPHOCYTES RELATIVE PERCENT: 17.2 % (ref 20–42)
MCH RBC QN AUTO: 26.8 PG (ref 26–35)
MCHC RBC AUTO-ENTMCNC: 29.7 % (ref 32–34.5)
MCV RBC AUTO: 90.2 FL (ref 80–99.9)
METER GLUCOSE: 130 MG/DL (ref 74–99)
METER GLUCOSE: 143 MG/DL (ref 74–99)
MONOCYTES ABSOLUTE: 0.82 E9/L (ref 0.1–0.95)
MONOCYTES RELATIVE PERCENT: 10 % (ref 2–12)
NEUTROPHILS ABSOLUTE: 5.5 E9/L (ref 1.8–7.3)
NEUTROPHILS RELATIVE PERCENT: 67.3 % (ref 43–80)
PDW BLD-RTO: 17.1 FL (ref 11.5–15)
PLATELET # BLD: 174 E9/L (ref 130–450)
PMV BLD AUTO: 10.4 FL (ref 7–12)
POTASSIUM SERPL-SCNC: 5.2 MMOL/L (ref 3.5–5)
PROTHROMBIN TIME: 15 SEC (ref 9.3–12.4)
RBC # BLD: 3.25 E12/L (ref 3.8–5.8)
SODIUM BLD-SCNC: 138 MMOL/L (ref 132–146)
WBC # BLD: 8.2 E9/L (ref 4.5–11.5)

## 2019-06-13 PROCEDURE — 85025 COMPLETE CBC W/AUTO DIFF WBC: CPT

## 2019-06-13 PROCEDURE — 80048 BASIC METABOLIC PNL TOTAL CA: CPT

## 2019-06-13 PROCEDURE — 36415 COLL VENOUS BLD VENIPUNCTURE: CPT

## 2019-06-13 PROCEDURE — 85610 PROTHROMBIN TIME: CPT

## 2019-06-13 PROCEDURE — 82962 GLUCOSE BLOOD TEST: CPT

## 2019-06-13 PROCEDURE — 6370000000 HC RX 637 (ALT 250 FOR IP): Performed by: INTERNAL MEDICINE

## 2019-06-13 RX ORDER — WARFARIN SODIUM 3 MG/1
1.5 TABLET ORAL DAILY
DISCHARGE
Start: 2019-06-13

## 2019-06-13 RX ORDER — PETROLATUM 42 G/100G
OINTMENT TOPICAL
Refills: 0 | DISCHARGE
Start: 2019-06-13

## 2019-06-13 RX ADMIN — INSULIN GLARGINE 10 UNITS: 100 INJECTION, SOLUTION SUBCUTANEOUS at 10:43

## 2019-06-13 RX ADMIN — DIVALPROEX SODIUM 125 MG: 125 TABLET, DELAYED RELEASE ORAL at 14:23

## 2019-06-13 RX ADMIN — AMLODIPINE BESYLATE 5 MG: 5 TABLET ORAL at 10:41

## 2019-06-13 RX ADMIN — SERTRALINE 100 MG: 100 TABLET, FILM COATED ORAL at 10:38

## 2019-06-13 RX ADMIN — Medication: at 10:43

## 2019-06-13 RX ADMIN — NEOMYCIN AND POLYMYXIN B SULFATES AND DEXAMETHASONE 0.5 INCH: 3.5; 10000; 1 OINTMENT OPHTHALMIC at 14:16

## 2019-06-13 RX ADMIN — MUPIROCIN: 20 OINTMENT TOPICAL at 11:00

## 2019-06-13 RX ADMIN — PETROLATUM: 42 OINTMENT TOPICAL at 14:16

## 2019-06-13 RX ADMIN — DAPSONE 50 MG: 25 TABLET ORAL at 10:38

## 2019-06-13 RX ADMIN — CINACALCET HYDROCHLORIDE 30 MG: 30 TABLET, FILM COATED ORAL at 10:38

## 2019-06-13 RX ADMIN — PETROLATUM: 42 OINTMENT TOPICAL at 10:42

## 2019-06-13 RX ADMIN — CARBIDOPA AND LEVODOPA 1 TABLET: 25; 100 TABLET ORAL at 14:23

## 2019-06-13 RX ADMIN — NEOMYCIN AND POLYMYXIN B SULFATES AND DEXAMETHASONE 0.5 INCH: 3.5; 10000; 1 OINTMENT OPHTHALMIC at 10:40

## 2019-06-13 RX ADMIN — PRAVASTATIN SODIUM 20 MG: 20 TABLET ORAL at 10:41

## 2019-06-13 RX ADMIN — LEVOTHYROXINE SODIUM 25 MCG: 25 TABLET ORAL at 06:22

## 2019-06-13 RX ADMIN — CARBIDOPA AND LEVODOPA 1 TABLET: 25; 100 TABLET ORAL at 10:38

## 2019-06-13 RX ADMIN — PREDNISONE 2.5 MG: 5 TABLET ORAL at 10:41

## 2019-06-13 RX ADMIN — DIVALPROEX SODIUM 125 MG: 125 TABLET, DELAYED RELEASE ORAL at 10:39

## 2019-06-13 ASSESSMENT — PAIN SCALES - GENERAL: PAINLEVEL_OUTOF10: 0

## 2019-06-13 NOTE — DISCHARGE SUMMARY
Physician Discharge Summary     Patient ID:  Nati Hargrove  [id number]  70 y.o.  1950    Admit date: 6/8/2019    Discharge date and time:  6/13/2019    Admission Diagnoses:   Patient Active Problem List   Diagnosis    History of DVT (deep vein thrombosis)    JAN[facility] (acute kidney injury) ([facility] [address]    Diabetes mellitus type 2, uncontrolled (HCC)    Hyperlipidemia LDL goal <100    Acquired hypothyroidism    Anemia of chronic disease    Bradycardia    Morbid obesity (HCC)    CKD (chronic kidney disease) stage 3, GFR 30-59 ml/min (HCC)    GIB (gastrointestinal bleeding)    Metastases to the liver (HCC)    Warfarin-induced coagulopathy ([facility] [address]    Hypercalcemia    Moderate protein-calorie malnutrition ([facility] [address]       Discharge Diagnoses: Active Problems:    History of DVT (deep vein thrombosis)    JANETH (acute kidney injury) ([facility] [address]    Diabetes mellitus type 2, uncontrolled (HCC)    Hyperlipidemia LDL goal <100    CKD (chronic kidney disease) stage 3, GFR 30-59 ml/min (HCC)    GIB (gastrointestinal bleeding)    Metastases to the liver (HCC)    Warfarin-induced coagulopathy (HCC)    Hypercalcemia    Moderate protein-calorie malnutrition ([facility] [address]  Resolved Problems:    * No resolved hospital problems. *      Consults: IP CONSULT TO HOSPITALIST  IP CONSULT TO ONCOLOGY  IP CONSULT TO GENERAL SURGERY  IP CONSULT TO NEPHROLOGY  IP CONSULT TO DIETITIAN    Procedures: CT-guided liver biopsy    Hospital Course: 39-year-old male history of DVT admitted with GI bleed bleed. Patient's INR was 8.7 initially. INR was reversed patient was found to have extensive metastatic liver lesions. This was biopsied after INR improved. Patient's will be resumed on low-dose Coumadin with recommendations for close following of INR. He will follow-up with general surgery and oncology as an outpatient.     Discharge Exam:  See progress note from today    Condition:  Stable    Disposition: SNF    Patient Instructions:   Current Discharge Medication List      START taking these medications    Details   !! miconazole nitrate 2 % OINT Apply topically 2 times daily  Refills: 0      mineral oil-hydrophilic petrolatum (HYDROPHOR) ointment Apply topically as needed. Refills: 0      !! miconazole nitrate 2 % OINT Apply topically 2 times daily as needed (after incontinence care)  Refills: 0      mupirocin (BACTROBAN) 2 % ointment Apply topically 3 times daily. !! - Potential duplicate medications found. Please discuss with provider. CONTINUE these medications which have CHANGED    Details   warfarin (COUMADIN) 3 MG tablet Take 0.5 tablets by mouth daily Monitor INR closely, patient had GI bleeding with INR greater than 8         CONTINUE these medications which have NOT CHANGED    Details   camphor-menthol (SARNA) 0.5-0.5 % lotion Apply topically as needed for Itching Apply topically as needed.       melatonin 3 MG TABS tablet Take 5 mg by mouth nightly as needed For sleep      aluminum & magnesium hydroxide-simethicone (MAALOX) 200-200-20 MG/5ML SUSP suspension Take 20 mLs by mouth every 4 hours as needed for Indigestion      cinacalcet (SENSIPAR) 30 MG tablet Take 30 mg by mouth daily      carbidopa-levodopa (SINEMET)  MG per tablet Take 1 tablet by mouth 3 times daily      sertraline (ZOLOFT) 100 MG tablet Take 100 mg by mouth daily      insulin detemir (LEVEMIR) 100 UNIT/ML injection vial Inject 10 Units into the skin 2 times daily       rOPINIRole (REQUIP) 0.25 MG tablet Take 0.25 mg by mouth nightly      divalproex (DEPAKOTE) 125 MG DR tablet Take 125 mg by mouth 3 times daily      amLODIPine (NORVASC) 5 MG tablet Take 1 tablet by mouth daily  Qty: 30 tablet, Refills: 0      dapsone 25 MG tablet Take 25 mg by mouth daily       predniSONE (DELTASONE) 10 MG tablet Take 2.5 mg by mouth daily       famotidine (PEPCID) 20 MG tablet Take 1 tablet by mouth 2 times daily  Qty: 60 tablet, Refills: 3      acetaminophen (TYLENOL) 325 MG tablet

## 2019-06-13 NOTE — PROGRESS NOTES
Nephrology progress Note    Patient's Name: Hudson Oliver  11:37 AM  6/13/2019  Subjective: Patient in radiology       Past Medical History:   Diagnosis Date    Acute gastritis without hemorrhage 4/5/2018    Cellulitis     left leg    Chronic kidney disease     stage 3    Diabetes mellitus ([facility] [address]     type 2    Diverticulosis of colon 4/5/2018    DVT of leg (deep venous thrombosis) (HCC)     left    Gait abnormality     Hx of blood clots     Hyperlipidemia     Hypertension     Metastases to the liver ([facility] [address] 6/9/2019    Obesity     Polyneuropathy     Restless leg syndrome     Sepsis ([facility] [address]     Smoker        Past Surgical History:   Procedure Laterality Date    KNEE SURGERY      left    DC COLONOSCOPY FLX DX W/COLLJ SPEC WHEN PFRMD N/A 4/4/2018    COLONOSCOPY DIAGNOSTIC --TIME UNDETERMINED performed by Amarilis Akers MD at [address] [place] [address] EGD TRANSORAL BIOPSY SINGLE/MULTIPLE N/A 4/4/2018    EGD BIOPSY performed by Amarilis Akers MD at [place] [address] History   Family history unknown: Yes        reports that he quit smoking about 4 years ago. His smoking use included cigarettes. He has a 80.00 pack-year smoking history. He has never used smokeless tobacco. He reports that he does not drink alcohol or use drugs. Allergies:  Patient has no known allergies.     Current Medications:      mineral oil-hydrophilic petrolatum (HYDROPHOR) ointment TID   miconazole nitrate 2 % ointment BID   And    miconazole nitrate 2 % ointment BID PRN   mupirocin (BACTROBAN) 2 % ointment Daily   neomycin-polymyxin-dexameth ophthalmic ointment 0.5 inch TID   camphor-menthol (SARNA) lotion PRN   pneumococcal 13-valent conjugate (PREVNAR) injection 0.5 mL Prior to discharge   sodium bicarbonate 75 mEq in sodium chloride 0.45 % 1,000 mL infusion Continuous   0.9 % sodium chloride bolus Once   insulin lispro (HUMALOG) injection vial 0-12 Units TID WC   insulin lispro (HUMALOG) injection vial 0-6 Units Nightly   glucose (GLUTOSE) 40 % oral gel 15 g PRN   dextrose 50 % IV solution PRN   glucagon (rDNA) injection 1 mg PRN   dextrose 5 % solution PRN   acetaminophen (TYLENOL) tablet 650 mg Q4H PRN   levothyroxine (SYNTHROID) tablet 25 mcg QAM   pravastatin (PRAVACHOL) tablet 20 mg Daily   vitamin D (ERGOCALCIFEROL) capsule 50,000 Units Weekly   famotidine (PEPCID) tablet 20 mg Daily   dapsone tablet 25 mg Daily   predniSONE (DELTASONE) tablet 2.5 mg Daily   amLODIPine (NORVASC) tablet 5 mg Daily   sertraline (ZOLOFT) tablet 100 mg Daily   insulin glargine (LANTUS) injection vial 10 Units BID   rOPINIRole (REQUIP) tablet 0.25 mg Nightly   divalproex (DEPAKOTE) DR tablet 125 mg TID   cinacalcet (SENSIPAR) tablet 30 mg Daily   carbidopa-levodopa (SINEMET)  MG per tablet 1 tablet TID   dapsone tablet 50 mg Daily       Review of Systems:   10 point review of system done at this time is negative except for the things mentioned in the HPI    Physical exam:   Constitutional:    Vitals: /63   Pulse 84   Temp 98.6 °F (37 °C) (Temporal)   Resp 16   Ht 5' 6\" (1.676 m)   Wt 219 lb 6.4 oz (99.5 kg)   SpO2 98%   BMI 35.41 kg/m²        Data:   Labs:  CBC:   Lab Results   Component Value Date    WBC 8.2 06/13/2019    RBC 3.25 06/13/2019    HGB 8.7 06/13/2019    HCT 29.3 06/13/2019    MCV 90.2 06/13/2019    MCH 26.8 06/13/2019    MCHC 29.7 06/13/2019    RDW 17.1 06/13/2019     06/13/2019    MPV 10.4 06/13/2019     CMP:    Lab Results   Component Value Date     06/13/2019    K 5.2 06/13/2019     06/13/2019    CO2 23 06/13/2019    BUN 26 06/13/2019    CREATININE 2.0 06/13/2019    GFRAA 40 06/13/2019    LABGLOM 33 06/13/2019    GLUCOSE 118 06/13/2019    GLUCOSE 173 01/23/2012    PROT 7.9 06/08/2019    LABALBU 3.5 06/08/2019    LABALBU 4.1 01/22/2012    CALCIUM 12.7 06/13/2019    BILITOT 0.2 06/08/2019    ALKPHOS 268 06/08/2019    AST 16 06/08/2019    ALT <5 06/08/2019     BMP:    Lab Results Component Value Date     2019    K 5.2 2019     2019    CO2 23 2019    BUN 26 2019    LABALBU 3.5 2019    LABALBU 4.1 2012    CREATININE 2.0 2019    CALCIUM 12.7 2019    GFRAA 40 2019    LABGLOM 33 2019    GLUCOSE 118 2019    GLUCOSE 173 2012     Calcium:    Lab Results   Component Value Date    CALCIUM 12.7 2019     Phosphorus:    Lab Results   Component Value Date    PHOS 2.4 2018     Uric Acid:  No results found for: URICACID  U/A:    Lab Results   Component Value Date    NITRU POSITIVE 2019    COLORU Yellow 2019    PHUR 6.0 2019    WBCUA 5-10 2019    RBCUA 1-3 2019    BACTERIA MANY 2019    CLARITYU Clear 2019    SPECGRAV 1.015 2019    LEUKOCYTESUR SMALL 2019    UROBILINOGEN 0.2 2019    BILIRUBINUR Negative 2019    BLOODU SMALL 2019    GLUCOSEU 100 2019    KETUA Negative 2019    AMORPHOUS FEW 10/21/2017        Imaging:  Ct Abdomen Pelvis Wo Contrast    Result Date: 2019  Patient MRN:  [id number] : 1950 Age: 70 years Gender: Male Order Date:  2019 4:45 PM TECHNIQUE/NUMBER OF IMAGES/COMPARISON/CLINICAL HISTORY: CT abdomen pelvis Axial images were obtained with sagittal and coronal reconstructions Comparison 2017. Clinical history diffuse abdominal pain. 58-year-old male patient with a history of diverticulosis of the colon diabetes. FINDINGS: The liver is enlarged with lobulated outlines with multiple confluent areas of hypodensity are present large metastatic implant sac, the largest 1's are seen in the left lobe in a continuing measuring several centimeters. Also lesions measure several centimeters seen throughout the right lobe including the caudate lobe. The spleen appears to be with borderline size are. Portal vein measures 14 mm. The gallbladder is normally distended, it appears unremarkable.  There is no dilatation of the biliary tree. Most of the biliary tract is distorted in the left elbow The mass effect present. There are also prominent to lymph nodes in the periportal spaces and in the area of the celiac axis. The pancreas demonstrates atrophy. There is fat replacement of the pancreas. The biliary tree and pancreatic ductal systems are not dilated. Adrenals not enlarged. Kidneys have a multifocal areas of scarring difficult to evaluate for any focal mass lesion without IV contrast. There is atrophy of the left kidney and one in the right kidney. Proper evaluation of the kidneys will required multiphase IV contrast. Ultrasound can be an alternative. There are also prominent lymph nodes in the periportal region although with a dominant central fat signs for some of them. Calcified atheroma changes are seen in the abdominal aorta there is no aneurysm formation There is diffuse thickening of the bladder wall. Prostate gland is mild to moderate enlarged. Seminal vesicles are symmetrical size. Extensive diverticulosis seen throughout the left-sided colon but there is no signs for acute diverticulitis. The small bowel has unremarkable appearance. No conspicuous ascites is identified. Lower lung bases demonstrate no significant findings. There is space are free. Degenerative changes are seen throughout the spine. There is a sclerotic area in the L2 vertebral body this could be suspicious for metastasis. Further evaluation with a nuclear medicine bone scan is initially recommended for a skeletal survey. Endovascular surgery changes are seen both hip joints. 1. Widespread metastatic liver disease with very large size lesion is present throughout the left lobe in particular and also in the right lobe. 2. Additional prominent metastatic adenopathy are seen in the periportal spaces/celiac axis. 3. Also metastatic adenopathy in lesser degree are present in the midline kasey aortic retroperitoneum spaces.  4. Extensive uncomplicated diverticulosis seen in the left-sided colon. No signs for acute diverticulitis. ALERT:  ABNORMAL REPORT. Results for Martita Skinner (MRN [id number]) as of 6/10/2019 14:12   Ref. Range 6/9/2019 05:50 6/9/2019 18:50   Chloride Latest Ref Range: Not Established mmol/L  40   Creatinine, Ur Latest Ref Range: 40 - 278 mg/dL  48   Osmolality, Ur Latest Ref Range: 300 - 900 mOsm/kg  318   Potassium, Ur Latest Ref Range: Not Established mmol/L  17.0   Sodium, Ur Latest Ref Range: Not Established mmol/L  45       Assessment  1. Acute kidney injury secondary to severe prerenal azotemia with worsening anemia, hypotension and poor by mouth intake  2. Hyperkalemia Resolved  3. Chronic kidney disease stage III B secondary to hypertensive nephrosclerosis and type II diabetes with a baseline creatinine of 2.0-2.4   4. Non-anion gap metabolic acidosis   5. Anemia secondary to chronic disease and iron deficiency   6. Metastatic liver cancer   7. Supratherapeutic INR   8. Hypercalcemia: multifactorial/ old pth high/ currently Ca+ mets     Plan    1. Continue current treatment. 2. Overall extremely poor prognosis   3. OK to discharge. 4. FU with Dr Reymundo Magana.             Electronically signed by Nkechi Amaya MD on 6/13/2019 at 11:37 AM

## 2019-06-13 NOTE — CARE COORDINATION
6/13/2019  Patient to discharge to [facility] today at 2 pm via [facility].  Patient notified

## 2019-06-13 NOTE — PROGRESS NOTES
Subjective:  Feeling better No CP, SOB, F, V, D, P     Objective:    /66   Pulse 72   Temp 97.2 °F (36.2 °C) (Temporal)   Resp 16   Ht 5' 6\" (1.676 m)   Wt 219 lb 6.4 oz (99.5 kg)   SpO2 98%   BMI 35.41 kg/m²     24HR INTAKE/OUTPUT:      Intake/Output Summary (Last 24 hours) at 6/13/2019 0839  Last data filed at 6/12/2019 2316  Gross per 24 hour   Intake --   Output 250 ml   Net -250 ml     nad confused  Heart:  RRR, no murmurs, gallops, or rubs. Lungs:  CTA bilaterally, no wheeze, rales or rhonchi  Abd: bowel sounds present, nontender, nondistended, no masses  Extrem:  No clubbing, cyanosis, or edema    Most Recent Labs  Lab Results   Component Value Date    WBC 8.2 06/13/2019    HGB 8.7 (L) 06/13/2019    HCT 29.3 (L) 06/13/2019     06/13/2019     06/13/2019    K 5.2 (H) 06/13/2019     06/13/2019    CREATININE 2.0 (H) 06/13/2019    BUN 26 (H) 06/13/2019    CO2 23 06/13/2019    GLUCOSE 118 (H) 06/13/2019    ALT <5 06/08/2019    AST 16 06/08/2019    INR 1.3 06/13/2019    TSH 1.850 11/27/2018    LABA1C 5.3 10/22/2017    LABMICR 249.7 (H) 04/02/2018     No results for input(s): MG in the last 72 hours. Lab Results   Component Value Date    CALCIUM 12.7 (H) 06/13/2019    PHOS 2.4 (L) 04/05/2018        CT Chest WO Contrast   Final Result   Extensive widespread infiltrative hypodense lesions in the liver   concerning for widespread malignancy with the extensive probably   metastatic malignancy in the upper abdomen. There is no indication for   intrathoracic malignancy. CT ABDOMEN PELVIS WO CONTRAST   Final Result   1. Widespread metastatic liver disease with very large size lesion is   present throughout the left lobe in particular and also in the right   lobe. 2. Additional prominent metastatic adenopathy are seen in the   periportal spaces/celiac axis. 3. Also metastatic adenopathy in lesser degree are present in the   midline kasey aortic retroperitoneum spaces. 4. Extensive uncomplicated diverticulosis seen in the left-sided   colon. No signs for acute diverticulitis. ALERT:  ABNORMAL REPORT. CT GUIDED NEEDLE PLACEMENT    (Results Pending)   CT NEEDLE BIOPSY LIVER PERCUTANEOUS    (Results Pending)       Assessment    Active Problems:    History of DVT (deep vein thrombosis)    JANETH (acute kidney injury) ([facility] [address]    Diabetes mellitus type 2, uncontrolled (HCC)    Hyperlipidemia LDL goal <100    CKD (chronic kidney disease) stage 3, GFR 30-59 ml/min (HCC)    GIB (gastrointestinal bleeding)    Metastases to the liver (HCC)    Warfarin-induced coagulopathy (HCC)    Hypercalcemia    Moderate protein-calorie malnutrition (HCC)  Resolved Problems:    * No resolved hospital problems. *        PLAN:    Admit  IV fluids  Reverse INR  Monitor H&H  Transfuse PRN maintain hemoglobin >= 7  IV PPI  Gen surgical consult   Oncology Consult appreciated    -Plan for CT-guided biopsy of liver mass  -Due to patient's history of DVT and his current active metastatic cancer I will resume his Coumadin and recommend careful monitoring of his INR. He was admitted with GI bleeding but his INR  was greater than 8  DC planning WILLARD after CT bx.       Electronically signed by Damon Elliott MD on 6/13/2019 at 8:39 AM

## 2019-06-15 LAB — PTH RELATED PEPTIDE: 4.6 PMOL/L (ref 0–2.3)

## (undated) DEVICE — CANNULA NSL ORAL AD FOR CAPNOFLEX CO2 O2 AIRLFE

## (undated) DEVICE — FORCEPS BX L160CM JAW DIA2.4MM YEL L CAP W/ NDL DISP RAD